# Patient Record
Sex: FEMALE | Race: ASIAN | Employment: FULL TIME | URBAN - METROPOLITAN AREA
[De-identification: names, ages, dates, MRNs, and addresses within clinical notes are randomized per-mention and may not be internally consistent; named-entity substitution may affect disease eponyms.]

---

## 2017-03-03 ENCOUNTER — OFFICE VISIT (OUTPATIENT)
Dept: FAMILY MEDICINE CLINIC | Age: 52
End: 2017-03-03

## 2017-03-03 VITALS
WEIGHT: 127 LBS | TEMPERATURE: 96.3 F | HEART RATE: 67 BPM | HEIGHT: 59 IN | RESPIRATION RATE: 20 BRPM | BODY MASS INDEX: 25.6 KG/M2 | SYSTOLIC BLOOD PRESSURE: 122 MMHG | DIASTOLIC BLOOD PRESSURE: 83 MMHG | OXYGEN SATURATION: 99 %

## 2017-03-03 DIAGNOSIS — I10 ESSENTIAL HYPERTENSION WITH GOAL BLOOD PRESSURE LESS THAN 140/90: ICD-10-CM

## 2017-03-03 DIAGNOSIS — E78.5 HYPERLIPIDEMIA, UNSPECIFIED HYPERLIPIDEMIA TYPE: ICD-10-CM

## 2017-03-03 DIAGNOSIS — R11.0 NAUSEA: ICD-10-CM

## 2017-03-03 DIAGNOSIS — E11.65 TYPE 2 DIABETES MELLITUS WITH HYPERGLYCEMIA, WITHOUT LONG-TERM CURRENT USE OF INSULIN (HCC): Primary | ICD-10-CM

## 2017-03-03 LAB
MICROALBUMIN UR TEST STR-MCNC: 150 MG/L
MICROALBUMIN/CREAT RATIO POC: NORMAL MG/G

## 2017-03-03 RX ORDER — AMOXICILLIN 500 MG/1
1 CAPSULE ORAL
COMMUNITY
Start: 2017-03-02 | End: 2018-09-05

## 2017-03-03 RX ORDER — IBUPROFEN 800 MG/1
TABLET ORAL AS NEEDED
COMMUNITY
Start: 2017-03-02 | End: 2020-02-13 | Stop reason: SDUPTHER

## 2017-03-03 RX ORDER — OMEPRAZOLE 20 MG/1
CAPSULE, DELAYED RELEASE ORAL
Qty: 30 CAP | Refills: 2 | Status: CANCELLED | OUTPATIENT
Start: 2017-03-03

## 2017-03-03 RX ORDER — ONDANSETRON 4 MG/1
4 TABLET, ORALLY DISINTEGRATING ORAL
Qty: 12 TAB | Refills: 0 | Status: SHIPPED | OUTPATIENT
Start: 2017-03-03 | End: 2018-09-05

## 2017-03-03 RX ORDER — METFORMIN HYDROCHLORIDE 1000 MG/1
TABLET ORAL
Qty: 180 TAB | Refills: 2 | Status: SHIPPED | OUTPATIENT
Start: 2017-03-03 | End: 2018-02-06 | Stop reason: SDUPTHER

## 2017-03-03 RX ORDER — LOSARTAN POTASSIUM 50 MG/1
50 TABLET ORAL DAILY
Qty: 90 TAB | Refills: 3 | Status: SHIPPED | OUTPATIENT
Start: 2017-03-03 | End: 2018-04-11 | Stop reason: SDUPTHER

## 2017-03-03 RX ORDER — LOVASTATIN 40 MG/1
40 TABLET ORAL
Qty: 90 TAB | Refills: 3 | Status: SHIPPED | OUTPATIENT
Start: 2017-03-03 | End: 2017-11-30 | Stop reason: DRUGHIGH

## 2017-03-03 RX ORDER — HYDROCODONE BITARTRATE AND ACETAMINOPHEN 10; 300 MG/1; MG/1
TABLET ORAL
COMMUNITY
End: 2018-09-05

## 2017-03-03 NOTE — PATIENT INSTRUCTIONS
DASH Diet: Care Instructions  Your Care Instructions  The DASH diet is an eating plan that can help lower your blood pressure. DASH stands for Dietary Approaches to Stop Hypertension. Hypertension is high blood pressure. The DASH diet focuses on eating foods that are high in calcium, potassium, and magnesium. These nutrients can lower blood pressure. The foods that are highest in these nutrients are fruits, vegetables, low-fat dairy products, nuts, seeds, and legumes. But taking calcium, potassium, and magnesium supplements instead of eating foods that are high in those nutrients does not have the same effect. The DASH diet also includes whole grains, fish, and poultry. The DASH diet is one of several lifestyle changes your doctor may recommend to lower your high blood pressure. Your doctor may also want you to decrease the amount of sodium in your diet. Lowering sodium while following the DASH diet can lower blood pressure even further than just the DASH diet alone. Follow-up care is a key part of your treatment and safety. Be sure to make and go to all appointments, and call your doctor if you are having problems. It's also a good idea to know your test results and keep a list of the medicines you take. How can you care for yourself at home? Following the DASH diet  · Eat 4 to 5 servings of fruit each day. A serving is 1 medium-sized piece of fruit, ½ cup chopped or canned fruit, 1/4 cup dried fruit, or 4 ounces (½ cup) of fruit juice. Choose fruit more often than fruit juice. · Eat 4 to 5 servings of vegetables each day. A serving is 1 cup of lettuce or raw leafy vegetables, ½ cup of chopped or cooked vegetables, or 4 ounces (½ cup) of vegetable juice. Choose vegetables more often than vegetable juice. · Get 2 to 3 servings of low-fat and fat-free dairy each day. A serving is 8 ounces of milk, 1 cup of yogurt, or 1 ½ ounces of cheese. · Eat 6 to 8 servings of grains each day.  A serving is 1 slice of bread, 1 ounce of dry cereal, or ½ cup of cooked rice, pasta, or cooked cereal. Try to choose whole-grain products as much as possible. · Limit lean meat, poultry, and fish to 2 servings each day. A serving is 3 ounces, about the size of a deck of cards. · Eat 4 to 5 servings of nuts, seeds, and legumes (cooked dried beans, lentils, and split peas) each week. A serving is 1/3 cup of nuts, 2 tablespoons of seeds, or ½ cup of cooked beans or peas. · Limit fats and oils to 2 to 3 servings each day. A serving is 1 teaspoon of vegetable oil or 2 tablespoons of salad dressing. · Limit sweets and added sugars to 5 servings or less a week. A serving is 1 tablespoon jelly or jam, ½ cup sorbet, or 1 cup of lemonade. · Eat less than 2,300 milligrams (mg) of sodium a day. If you limit your sodium to 1,500 mg a day, you can lower your blood pressure even more. Tips for success  · Start small. Do not try to make dramatic changes to your diet all at once. You might feel that you are missing out on your favorite foods and then be more likely to not follow the plan. Make small changes, and stick with them. Once those changes become habit, add a few more changes. · Try some of the following:  ¨ Make it a goal to eat a fruit or vegetable at every meal and at snacks. This will make it easy to get the recommended amount of fruits and vegetables each day. ¨ Try yogurt topped with fruit and nuts for a snack or healthy dessert. ¨ Add lettuce, tomato, cucumber, and onion to sandwiches. ¨ Combine a ready-made pizza crust with low-fat mozzarella cheese and lots of vegetable toppings. Try using tomatoes, squash, spinach, broccoli, carrots, cauliflower, and onions. ¨ Have a variety of cut-up vegetables with a low-fat dip as an appetizer instead of chips and dip. ¨ Sprinkle sunflower seeds or chopped almonds over salads. Or try adding chopped walnuts or almonds to cooked vegetables. ¨ Try some vegetarian meals using beans and peas. Add garbanzo or kidney beans to salads. Make burritos and tacos with mashed cardoso beans or black beans. Where can you learn more? Go to http://georgette-leslie.info/. Enter F576 in the search box to learn more about \"DASH Diet: Care Instructions. \"  Current as of: March 23, 2016  Content Version: 11.1  © 0724-7137 [employer]. Care instructions adapted under license by [employer] (which disclaims liability or warranty for this information). If you have questions about a medical condition or this instruction, always ask your healthcare professional. [address] any warranty or liability for your use of this information. Learning About Diabetes and Your Teeth  How does diabetes affect your teeth and gums? When you have diabetes, managing blood sugar levels and taking good care of your teeth and gums are both important. When blood sugar levels are high, there's a greater risk for:  · Gum (periodontal) disease. · Tooth decay. · Fungal infections in the mouth, like thrush. · Dry mouth, or xerostomia (say \"zee-ru-STO-gia-\"). The mouth needs saliva to neutralize the acids in your mouth. These acids can lead to gum disease and tooth decay. Keeping your blood sugar levels in your target range can help prevent problems with the teeth and gums. If you have any problems with your teeth or gums, see your dentist.  How do you care for your teeth and gums when you have diabetes? · Brush your teeth twice a day. · Floss daily. Make sure to press the floss against your teeth and not your gums. · Check each day for areas where your gums might be red or painful. Be sure to let your dentist know of any sores in your mouth. · See your dentist regularly for professional cleaning of your teeth and to look for gum problems. Many dentists recommend getting checkups twice a year. Remind your dentist that you have diabetes before any work is done.   · Don't smoke or use smokeless tobacco. Tobacco use with diabetes can lead to a greater risk of severe gum disease. If you need help quitting, talk to your doctor about stop-smoking programs and medicines. These can increase your chances of quitting for good. Follow-up care is a key part of your treatment and safety. Be sure to make and go to all appointments, and call your doctor if you are having problems. It's also a good idea to know your test results and keep a list of the medicines you take. Where can you learn more? Go to http://georgette-leslie.info/. Enter P737 in the search box to learn more about \"Learning About Diabetes and Your Teeth. \"  Current as of: May 23, 2016  Content Version: 11.1  © 2438-2844 [employer]. Care instructions adapted under license by [employer] (which disclaims liability or warranty for this information). If you have questions about a medical condition or this instruction, always ask your healthcare professional. [place] [address] any warranty or liability for your use of this information. Aprenda acerca de la diabetes y los dientes - [ Learning About Diabetes and Your Teeth ]  ¿Cómo afecta la diabetes a los dientes y a las encías? Cuando usted tiene diabetes, es importante controlar los niveles de azúcar en la sami y cuidarse lisbet los dientes y las encías. Cuando los niveles de azúcar en la sami están altos, hay un mayor riesgo de:  · Enfermedad de las encías (periodontal). · Caries. · Infecciones por hongos en la boca, symone candidiasis (aftas). · Sequedad de boca o xerostomía. La boca necesita saliva para neutralizar los ácidos en la boca. Estos ácidos pueden provocar enfermedad de las encías y caries dental.  Mantener los niveles de azúcar en la sami dentro de erin límites ideales puede ayudar a prevenir problemas con los dientes y las encías.  Si usted tiene algún problema con erin dientes o encías, consulte a carlisle dentista. ¿Cómo puede cuidarse los dientes y las encías cuando tiene diabetes? · Cepíllese los Aditya Hannifin veces al día. · Límpiese con hilo dental a diario. Asegúrese de presionar el hilo dental contra los dientes y no contra las encías. · Examínese las encías a diario para jon si hay zonas en las que están enrojecidas o adoloridas. No dude en informar a carlisle dentista si tiene llagas en la boca. · Visite a carlisle dentista con regularidad para leslee limpieza profesional de los dientes y para detectar problemas de las encías. Muchos dentistas recomiendan Dashawn Zee al año. Recuérdele a carlisle dentista que usted tiene diabetes antes de cualquier tratamiento dental.  · No fume ni use tabaco sin humo. Consumir tabaco cuando se tiene diabetes puede aumentar el riesgo de tener enfermedad de las encías grave. Si necesita ayuda para dejar el hábito, hable con carlisle médico sobre programas y medicamentos para dejar de fumar. Estos pueden aumentar erin probabilidades de dejar de fumar para siempre. La atención de seguimiento es leslee parte clave de carlisle tratamiento y seguridad. Asegúrese de hacer y acudir a todas las citas, y llame a carlisle médico si está teniendo problemas. También es leslee buena idea saber los resultados de erin exámenes y mantener leslee lista de los medicamentos que nicolette. ¿Dónde puede encontrar más información en inglés? Keanu García a http://georgette-leslie.info/. Escriba G654 en la búsqueda para aprender más acerca de \"Aprenda acerca de la diabetes y los dientes - [ Learning About Diabetes and Your Teeth ]. \"  Revisado: 23 [place], 2016  Versión del contenido: 11.1  © 9813-6996 [employer], [employer]. Las instrucciones de cuidado fueron adaptadas bajo licencia por [employer] (which disclaims liability or warranty for this information). Si usted tiene Seneca Vaughn afección médica o sobre estas instrucciones, siempre pregunte a carlisle profesional de sandra.  [employer], [employer] niega toda garantía o responsabilidad por carlisle uso de esta información.

## 2017-03-03 NOTE — MR AVS SNAPSHOT
Visit Information Date & Time Provider Department Dept. Phone Encounter #  
 3/3/2017  8:30 [address], [address] [id number] Follow-up Instructions Return in about 6 months (around 9/3/2017) for follow-up with Dr. Alex Woo. Upcoming Health Maintenance Date Due Pneumococcal 19-64 Medium Risk (1 of 1 - PPSV23) 11/13/1985 DTaP/Tdap/Td series (1 - Tdap) 11/13/1987 PAP AKA CERVICAL CYTOLOGY 6/15/2013 FOOT EXAM Q1 6/12/2016 INFLUENZA AGE 9 TO ADULT 8/1/2016 BREAST CANCER SCRN MAMMOGRAM 11/13/2016 FOBT Q 1 YEAR AGE 50-75 11/13/2016 MICROALBUMIN Q1 2/4/2017 HEMOGLOBIN A1C Q6M 2/11/2017 EYE EXAM RETINAL OR DILATED Q1 6/23/2017 LIPID PANEL Q1 8/11/2017 Allergies as of 3/3/2017  Review Complete On: 3/3/2017 By: Christie Moe LPN Severity Noted Reaction Type Reactions Lisinopril Medium 05/12/2014   Intolerance Cough Current Immunizations  Reviewed on 8/11/2016 No immunizations on file. Not reviewed this visit You Were Diagnosed With   
  
 Codes Comments Type 2 diabetes mellitus with hyperglycemia, without long-term current use of insulin (HCC)    -  Primary ICD-10-CM: E11.65 ICD-9-CM: 250.00, 790.29 Essential hypertension with goal blood pressure less than 140/90     ICD-10-CM: I10 
ICD-9-CM: 401.9 Hyperlipidemia, unspecified hyperlipidemia type     ICD-10-CM: E78.5 ICD-9-CM: 272.4 Nausea     ICD-10-CM: R11.0 ICD-9-CM: 787.02 Vitals BP  
  
  
  
  
  
 122/83 (BP 1 Location: Left arm, BP Patient Position: Sitting) Vitals History BMI and BSA Data Body Mass Index Body Surface Area  
 25.65 kg/m 2 1.55 m 2 Preferred Pharmacy Pharmacy Name Phone [facility] [address], [place] [phone] Your Updated Medication List  
  
   
 This list is accurate as of: 3/3/17  9:34 AM.  Always use your most recent med list.  
  
  
  
  
 aspirin 325 mg tablet Commonly known as:  ASPIRIN Take 325 mg by mouth daily. HYDROcodone-acetaminophen  mg Tab per tablet Commonly known as:  Colin Stai Take  by mouth.  
  
 losartan 50 mg tablet Commonly known as:  COZAAR Take 1 Tab by mouth daily. lovastatin 40 mg tablet Commonly known as:  MEVACOR Take 1 Tab by mouth nightly. Indications: mixed hyperlipidemia  
  
 metFORMIN 1,000 mg tablet Commonly known as:  GLUCOPHAGE  
TAKE ONE TABLET BY MOUTH TWICE DAILY WITH MEALS  Indications: type 2 diabetes mellitus  
  
 ondansetron 4 mg disintegrating tablet Commonly known as:  ZOFRAN ODT Take 1 Tab by mouth every eight (8) hours as needed for Nausea. SITagliptin 100 mg tablet Commonly known as:  Jesus Juan Take 1 Tab by mouth daily. Prescriptions Sent to Pharmacy Refills  
 losartan (COZAAR) 50 mg tablet 3 Sig: Take 1 Tab by mouth daily. Class: Normal  
 Pharmacy: [facility] [address] [address] [place] [address], [address] Ph #: [phone] Route: Oral  
 SITagliptin (JANUVIA) 100 mg tablet 6 Sig: Take 1 Tab by mouth daily. Class: Normal  
 Pharmacy: [facility] [address] [address], [address] Ph #: [phone] Route: Oral  
 metFORMIN (GLUCOPHAGE) 1,000 mg tablet 2 Sig: TAKE ONE TABLET BY MOUTH TWICE DAILY WITH MEALS  Indications: type 2 diabetes mellitus Class: Normal  
 Pharmacy: [facility] [address] [address], [address] Ph #: [phone]  
 lovastatin (MEVACOR) 40 mg tablet 3 Sig: Take 1 Tab by mouth nightly. Indications: mixed hyperlipidemia Class: Normal  
 Pharmacy: [id number] [address], [address] Ph #: [phone]  Route: Oral  
 ondansetron (ZOFRAN ODT) 4 mg disintegrating tablet 0  
 Sig: Take 1 Tab by mouth every eight (8) hours as needed for Nausea. Class: Normal  
 Pharmacy: [facility] [address], [address] Ph #: [phone] Route: Oral  
  
We Performed the Following AMB POC URINE, MICROALBUMIN, SEMIQUANTITATIVE [88097 CPT(R)] Follow-up Instructions Return in about 6 months (around 9/3/2017) for follow-up with Dr. Donald Chris. To-Do List   
 03/03/2017 Lab:  HEMOGLOBIN A1C WITH EAG Patient Instructions DASH Diet: Care Instructions Your Care Instructions The DASH diet is an eating plan that can help lower your blood pressure. DASH stands for Dietary Approaches to Stop Hypertension. Hypertension is high blood pressure. The DASH diet focuses on eating foods that are high in calcium, potassium, and magnesium. These nutrients can lower blood pressure. The foods that are highest in these nutrients are fruits, vegetables, low-fat dairy products, nuts, seeds, and legumes. But taking calcium, potassium, and magnesium supplements instead of eating foods that are high in those nutrients does not have the same effect. The DASH diet also includes whole grains, fish, and poultry. The DASH diet is one of several lifestyle changes your doctor may recommend to lower your high blood pressure. Your doctor may also want you to decrease the amount of sodium in your diet. Lowering sodium while following the DASH diet can lower blood pressure even further than just the DASH diet alone. Follow-up care is a key part of your treatment and safety. Be sure to make and go to all appointments, and call your doctor if you are having problems. It's also a good idea to know your test results and keep a list of the medicines you take. How can you care for yourself at home? Following the DASH diet · Eat 4 to 5 servings of fruit each day.  A serving is 1 medium-sized piece of fruit, ½ cup chopped or canned fruit, 1/4 cup dried fruit, or 4 ounces (½ cup) of fruit juice. Choose fruit more often than fruit juice. · Eat 4 to 5 servings of vegetables each day. A serving is 1 cup of lettuce or raw leafy vegetables, ½ cup of chopped or cooked vegetables, or 4 ounces (½ cup) of vegetable juice. Choose vegetables more often than vegetable juice. · Get 2 to 3 servings of low-fat and fat-free dairy each day. A serving is 8 ounces of milk, 1 cup of yogurt, or 1 ½ ounces of cheese. · Eat 6 to 8 servings of grains each day. A serving is 1 slice of bread, 1 ounce of dry cereal, or ½ cup of cooked rice, pasta, or cooked cereal. Try to choose whole-grain products as much as possible. · Limit lean meat, poultry, and fish to 2 servings each day. A serving is 3 ounces, about the size of a deck of cards. · Eat 4 to 5 servings of nuts, seeds, and legumes (cooked dried beans, lentils, and split peas) each week. A serving is 1/3 cup of nuts, 2 tablespoons of seeds, or ½ cup of cooked beans or peas. · Limit fats and oils to 2 to 3 servings each day. A serving is 1 teaspoon of vegetable oil or 2 tablespoons of salad dressing. · Limit sweets and added sugars to 5 servings or less a week. A serving is 1 tablespoon jelly or jam, ½ cup sorbet, or 1 cup of lemonade. · Eat less than 2,300 milligrams (mg) of sodium a day. If you limit your sodium to 1,500 mg a day, you can lower your blood pressure even more. Tips for success · Start small. Do not try to make dramatic changes to your diet all at once. You might feel that you are missing out on your favorite foods and then be more likely to not follow the plan. Make small changes, and stick with them. Once those changes become habit, add a few more changes. · Try some of the following: ¨ Make it a goal to eat a fruit or vegetable at every meal and at snacks. This will make it easy to get the recommended amount of fruits and vegetables each day. ¨ Try yogurt topped with fruit and nuts for a snack or healthy dessert. ¨ Add lettuce, tomato, cucumber, and onion to sandwiches. ¨ Combine a ready-made pizza crust with low-fat mozzarella cheese and lots of vegetable toppings. Try using tomatoes, squash, spinach, broccoli, carrots, cauliflower, and onions. ¨ Have a variety of cut-up vegetables with a low-fat dip as an appetizer instead of chips and dip. ¨ Sprinkle sunflower seeds or chopped almonds over salads. Or try adding chopped walnuts or almonds to cooked vegetables. ¨ Try some vegetarian meals using beans and peas. Add garbanzo or kidney beans to salads. Make burritos and tacos with mashed cardoso beans or black beans. Where can you learn more? Go to http://georgette-leslie.info/. Enter K666 in the search box to learn more about \"DASH Diet: Care Instructions. \" Current as of: March 23, 2016 Content Version: 11.1 © 3172-3215 [employer]. Care instructions adapted under license by [employer] (which disclaims liability or warranty for this information). If you have questions about a medical condition or this instruction, always ask your healthcare professional. [address] any warranty or liability for your use of this information. Learning About Diabetes and Your Teeth How does diabetes affect your teeth and gums? When you have diabetes, managing blood sugar levels and taking good care of your teeth and gums are both important. When blood sugar levels are high, there's a greater risk for: · Gum (periodontal) disease. · Tooth decay. · Fungal infections in the mouth, like thrush. · Dry mouth, or xerostomia (say \"zee-ruh-STO-gia-uh\"). The mouth needs saliva to neutralize the acids in your mouth. These acids can lead to gum disease and tooth decay. Keeping your blood sugar levels in your target range can help prevent problems with the teeth and gums.  If you have any problems with your teeth or gums, see your dentist. 
 How do you care for your teeth and gums when you have diabetes? · Brush your teeth twice a day. · Floss daily. Make sure to press the floss against your teeth and not your gums. · Check each day for areas where your gums might be red or painful. Be sure to let your dentist know of any sores in your mouth. · See your dentist regularly for professional cleaning of your teeth and to look for gum problems. Many dentists recommend getting checkups twice a year. Remind your dentist that you have diabetes before any work is done. · Don't smoke or use smokeless tobacco. Tobacco use with diabetes can lead to a greater risk of severe gum disease. If you need help quitting, talk to your doctor about stop-smoking programs and medicines. These can increase your chances of quitting for good. Follow-up care is a key part of your treatment and safety. Be sure to make and go to all appointments, and call your doctor if you are having problems. It's also a good idea to know your test results and keep a list of the medicines you take. Where can you learn more? Go to http://georgetteBackspacesleslie.info/. Enter M740 in the search box to learn more about \"Learning About Diabetes and Your Teeth. \" 
Current as of: May 23, 2016 Content Version: 11.1 © 0853-1879 [employer]. Care instructions adapted under license by [employer] (which disclaims liability or warranty for this information). If you have questions about a medical condition or this instruction, always ask your healthcare professional. [place] [address] any warranty or liability for your use of this information. Aprenda acerca de la diabetes y los dientes - [ Learning About Diabetes and Your Teeth ] Cómo afecta la diabetes a los dientes y a las encías? Cuando usted tiene diabetes, es importante controlar los niveles de azúcar en la sami y cuidarse lisbet los dientes y las encías.  Cuando los [employer] de azúcar en la sami están altos, hay un mayor riesgo de: 
· Enfermedad de las encías (periodontal). · Caries. · Infecciones por hongos en la boca, symone candidiasis (aftas). · Sequedad de boca o xerostomía. La boca necesita saliva para neutralizar los ácidos en la boca. Estos ácidos pueden provocar enfermedad de las encías y caries dental. 
Mantener los niveles de azúcar en la sami dentro de erin límites ideales puede ayudar a prevenir problemas con los dientes y las encías. Si usted tiene algún problema con erin dientes o encías, consulte a carlisle dentista. Cómo puede cuidarse los dientes y las encías cuando tiene diabetes? · Cepíllese los Aditya Hannifin veces al día. · Límpiese con hilo dental a diario. Asegúrese de presionar el hilo dental contra los dientes y no contra las encías. · Examínese las encías a diario para jon si hay zonas en las que están enrojecidas o adoloridas. No dude en informar a carlisle dentista si tiene llagas en la boca. · Visite a carlisle dentista con regularidad para leslee limpieza profesional de los dientes y para detectar problemas de las encías. Muchos dentistas recomiendan Dashawn Zee al año. Recuérdele a carlisle dentista que usted tiene diabetes antes de cualquier tratamiento dental. 
· No fume ni use tabaco sin humo. Consumir tabaco cuando se tiene diabetes puede aumentar el riesgo de tener enfermedad de las encías grave. Si necesita ayuda para dejar el hábito, hable con carlisle médico sobre programas y medicamentos para dejar de fumar. Estos pueden aumentar erin probabilidades de dejar de fumar para siempre. La atención de seguimiento es leslee parte clave de carlisle tratamiento y seguridad. Asegúrese de hacer y acudir a todas las citas, y llame a carlisle médico si está teniendo problemas. También es leslee buena idea saber los resultados de erin exámenes y mantener leslee lista de los medicamentos que nicolette. Dónde puede encontrar más información en inglés? Tristen Barbosa a http://georgette-leslie.info/. Escriba H932 en la búsqueda para aprender más acerca de \"Aprenda acerca de la diabetes y los dientes - [ Learning About Diabetes and Your Teeth ]. \" 
Revisado: 23 mayo, 2016 Versión del contenido: 11.1 © 1457-7381 Healthwise, Incorporated. Las instrucciones de cuidado fueron adaptadas bajo licencia por [employer] (which disclaims liability or warranty for this information). Si usted tiene [place] afección médica o sobre estas instrucciones, siempre pregunte a carlisle profesional de sandra. Healthwise, Incorporated niega toda garantía o responsabilidad por carlisle uso de esta información. Introducing [address]! Dear Mirella Minor: 
Thank you for requesting a [employer] account. Our records indicate that you already have an active [employer] account. You can access your account anytime at [email or web address] Did you know that you can access your hospital and ER discharge instructions at any time in [employer]? You can also review all of your test results from your hospital stay or ER visit. Additional Information If you have questions, please visit the Frequently Asked Questions section of the [employer] website at [email or web address] Remember, [employer] is NOT to be used for urgent needs. For medical emergencies, dial 911. Now available from your iPhone and Android! Please provide this summary of care documentation to your next provider. Your primary care clinician is listed as Misael Brooks. If you have any questions after today's visit, please call [phone].

## 2017-03-03 NOTE — PROGRESS NOTES
Chief Complaint   Patient presents with    Medication Refill     hypertension dm cholesterol         HPI:    This is a 48 y/o female patient who comes in today for follow-up on chronic conditions and medication refills. DM - last A1C 6.9. Currently on Metformin and Januvia. Denies significant complaints except for mouth pain secondary to tooth extraction done yesterday. Takes hydrocodone or ibuprofen for pain. Feels nauseated at times. Past Medical History:   Diagnosis Date    Environmental allergies     Headache(784.0)     hx migraines    HTN (hypertension) 9/23/2013    Lacunar infarct, acute ([address] 2/17/16    admitt[facility]/17-2/19    Stroke [facility])     tia 9/10/13    Type II or unspecified type diabetes mellitus without mention of complication, not stated as uncontrolled 10/10/2013     Social History   Substance Use Topics    Smoking status: Never Smoker    Smokeless tobacco: Never Used    Alcohol use No     Outpatient Encounter Prescriptions as of 3/3/2017   Medication Sig Dispense Refill    HYDROcodone-acetaminophen (XODOL)  mg tab per tablet Take  by mouth.  losartan (COZAAR) 50 mg tablet Take 1 Tab by mouth daily. 90 Tab 3    SITagliptin (JANUVIA) 100 mg tablet Take 1 Tab by mouth daily. 30 Tab 6    metFORMIN (GLUCOPHAGE) 1,000 mg tablet TAKE ONE TABLET BY MOUTH TWICE DAILY WITH MEALS  Indications: type 2 diabetes mellitus 180 Tab 2    lovastatin (MEVACOR) 40 mg tablet Take 1 Tab by mouth nightly. Indications: mixed hyperlipidemia 90 Tab 3    ondansetron (ZOFRAN ODT) 4 mg disintegrating tablet Take 1 Tab by mouth every eight (8) hours as needed for Nausea. 12 Tab 0    aspirin (ASPIRIN) 325 mg tablet Take 325 mg by mouth daily.  amoxicillin (AMOXIL) 500 mg capsule 1 Cap.  ibuprofen (MOTRIN) 800 mg tablet        No facility-administered encounter medications on file as of 3/3/2017. ROS:    Negative other than that mentioned in HPI.     Physical Exam:    Vital Signs:   Visit Vitals    /83 (BP 1 Location: Left arm, BP Patient Position: Sitting)    Pulse 67    Temp 96.3 °F (35.7 °C) (Oral)    Resp 20    Ht 4' 11\" (1.499 m)    Wt 127 lb (57.6 kg)    SpO2 99%  Comment: room air    BMI 25.65 kg/m2     General: a, a & o x 3, afebrile, well-nourished, interacting appropriately, in no acute distress  Skin: warm and dry, no rashes , no bruises  Respiratory: even chest expansion, lung sounds clear bilaterally, good respiratory effort,  no wheezes or crackles  Cardiovascular: normal S1S2, regular rate and rhythm, no murmurs, capillary refills < 2 sec, no JVD, no carotid bruits, all pulses palpable, 3+, no edema  Abdomen: non-distended, normoactive bowel sounds x 4 quadrants, soft, non-tender to palpation  Neurological: sensation and motor function intact,       Assessment/Plan:    1. Type 2 diabetes mellitus with hyperglycemia, without long-term current use of insulin (HCC)    - Last A1C 6.9  - SITagliptin (JANUVIA) 100 mg tablet; Take 1 Tab by mouth daily. Dispense: 30 Tab; Refill: 6  - metFORMIN (GLUCOPHAGE) 1,000 mg tablet; TAKE ONE TABLET BY MOUTH TWICE DAILY WITH MEALS  Indications: type 2 diabetes mellitus  Dispense: 180 Tab; Refill: 2  - HEMOGLOBIN A1C WITH EAG; Future  - AMB POC URINE, MICROALBUMIN, SEMIQUANTITATIVE    2. Essential hypertension with goal blood pressure less than 140/90    - stable, at goal  - losartan (COZAAR) 50 mg tablet; Take 1 Tab by mouth daily. Dispense: 90 Tab; Refill: 3    3. Hyperlipidemia, unspecified hyperlipidemia type    - last lipid profile stable  - lovastatin (MEVACOR) 40 mg tablet; Take 1 Tab by mouth nightly. Indications: mixed hyperlipidemia  Dispense: 90 Tab; Refill: 3    4. Nausea    - had dental work-up done, currently taking hydrocodone and ibuprofen for pain which make her nauseated at times  - ondansetron (ZOFRAN ODT) 4 mg disintegrating tablet;  Take 1 Tab by mouth every eight (8) hours as needed for Nausea. Dispense: 12 Tab; Refill: 0    Additional Notes: Discussed today's diagnosis, treatment plans. Discussed medication indications and side effects. After Visit Summary: Discussed provided printed patient instructions. Answered questions . Follow-up Disposition:  Return in about 6 months (around 9/3/2017) for follow-up with Dr. Prerna Mc. Ryan Blancas, ANP-BC  Adult Medicine  [facility]

## 2017-03-03 NOTE — PROGRESS NOTES
Pt here today for medication refills for hypertension dm and cholesterol meds    1. Have you been to the ER, urgent care clinic since your last visit? Hospitalized since your last visit? No    2. Have you seen or consulted any other health care providers outside of the [address] [place] [address] since your last visit? Include any pap smears or colon screening.  No

## 2017-03-04 LAB
EST. AVERAGE GLUCOSE BLD GHB EST-MCNC: 134 MG/DL
HBA1C MFR BLD: 6.3 % (ref 4.8–5.6)

## 2017-11-30 ENCOUNTER — OFFICE VISIT (OUTPATIENT)
Dept: FAMILY MEDICINE CLINIC | Age: 52
End: 2017-11-30

## 2017-11-30 VITALS
WEIGHT: 131 LBS | HEIGHT: 59 IN | RESPIRATION RATE: 18 BRPM | TEMPERATURE: 96.3 F | SYSTOLIC BLOOD PRESSURE: 112 MMHG | HEART RATE: 72 BPM | BODY MASS INDEX: 26.41 KG/M2 | DIASTOLIC BLOOD PRESSURE: 71 MMHG

## 2017-11-30 DIAGNOSIS — E11.65 TYPE 2 DIABETES MELLITUS WITH HYPERGLYCEMIA, WITHOUT LONG-TERM CURRENT USE OF INSULIN (HCC): ICD-10-CM

## 2017-11-30 DIAGNOSIS — Z12.31 SCREENING MAMMOGRAM, ENCOUNTER FOR: ICD-10-CM

## 2017-11-30 DIAGNOSIS — I10 ESSENTIAL HYPERTENSION WITH GOAL BLOOD PRESSURE LESS THAN 140/90: Primary | ICD-10-CM

## 2017-11-30 RX ORDER — LOVASTATIN 20 MG/1
40 TABLET ORAL
Qty: 180 TAB | Refills: 2 | Status: SHIPPED | OUTPATIENT
Start: 2017-11-30 | End: 2018-06-29 | Stop reason: DRUGHIGH

## 2017-11-30 NOTE — PATIENT INSTRUCTIONS
Learning About ACE Inhibitors and ARBs for Diabetes  Introduction    ACE inhibitors and ARBs are medicines used to control blood pressure. They allow blood vessels to relax and open up. This lowers your blood pressure. When you have diabetes, taking an ACE inhibitor or ARB can help to:  · Treat high blood pressure. Your risk of problems from diabetes goes up when you have high blood pressure. · Prevent or slow kidney damage. Diabetes can damage the blood vessels in the kidneys. High blood pressure can damage the kidneys, too. · Lower the risks of stroke and heart attack. Your risks go up when you have high blood pressure, heart disease, or both. An ACE inhibitor or ARB is a good choice for people with diabetes. Unlike some medicines, these don't affect blood sugar levels. Examples  ACE inhibitors include:  · Benazepril. · Lisinopril. · Ramipril. ARBs include:  · Irbesartan. · Losartan. · Telmisartan. Possible side effects  All medicines can cause side effects. Some side effects of ACE inhibitors include:  · Low blood pressure. You may feel dizzy and weak. · A cough. · High potassium levels. · An allergic reaction of the skin. Symptoms may range from mild swelling to painful welts. Some side effects of ARBs include:  · Diarrhea. · High potassium levels. · Sinus problems. · Stomach problems. You may have other side effects or reactions not listed here. Check the information that comes with your medicine. What to know about taking this medicine  · Be safe with medicines. Take your medicines exactly as prescribed. Call your doctor if you think you are having a problem with your medicine. · Before starting an ACE inhibitor or ARB, tell your doctor if you:  ¨ Use a salt substitute. ¨ Take diuretics or potassium tablets. · These medicines are not safe for pregnancy. If you are pregnant or planning to be, talk to your doctor about a safe blood pressure medicine.   · ACE inhibitors can cause a dry cough. If the cough is bad, talk to your doctor. Switching to an ARB is likely to help. · Taking some medicines together can cause problems. Tell your doctor or pharmacist all the medicines you take. This includes over-the-counter medicines, vitamins, herbal products, and supplements. · You may need regular blood and urine tests. Where can you learn more? Go to http://georgette-leslie.info/. Enter M316 in the search box to learn more about \"Learning About ACE Inhibitors and ARBs for Diabetes. \"  Current as of: March 13, 2017  Content Version: 11.4  © 2161-1290 [employer]. Care instructions adapted under license by [employer] (which disclaims liability or warranty for this information). If you have questions about a medical condition or this instruction, always ask your healthcare professional. [address] any warranty or liability for your use of this information.

## 2017-11-30 NOTE — PROGRESS NOTES
Joseluis Jones is a 47 y.o. female and presents with Follow-up (DM, HTN, hyperlipidemia) and Medication Refill       Subjective:    No current problems, here for routine follow up of chronic conditions. ROS:  Constitutional: No recent weight change. No weakness/fatigue. No fever or chills   Skin: No rashes, change in nails/hair, itching   HENT: No HA, dizziness. No hearing loss/tinnitus. No nasal congestion/discharge. Eyes: No change in vision, double/blurred vision or eye pain/redness. Cardiovascular: No CP/palpitations. No JASON/orthopnea/PND. Respiratory: No cough/sputum, dyspnea, wheezing. Gastointestinal: No dysphagia, reflux. No n/v. No constipation/diarrhea. No melena/rectal bleeding. Neurological: No seizures/numbness/weakness. No paresthesias. Psychiatric:  No depression, anxiety. The problem list was updated as a part of today's visit. Patient Active Problem List   Diagnosis Code    Aneurysm of middle cerebral artery I67.1    Hyperglycemia due to type 2 diabetes mellitus ([facility] [address] E11.65    Essential hypertension with goal blood pressure less than 140/90 I10    Gastroesophageal reflux disease K21.9    Multiple thyroid nodules E04.2       The PSH, FH were reviewed. SH:  Social History   Substance Use Topics    Smoking status: Never Smoker    Smokeless tobacco: Never Used    Alcohol use No         Medications/Allergies:  Current Outpatient Prescriptions on File Prior to Visit   Medication Sig Dispense Refill    losartan (COZAAR) 50 mg tablet Take 1 Tab by mouth daily. 90 Tab 3    metFORMIN (GLUCOPHAGE) 1,000 mg tablet TAKE ONE TABLET BY MOUTH TWICE DAILY WITH MEALS  Indications: type 2 diabetes mellitus 180 Tab 2    aspirin (ASPIRIN) 325 mg tablet Take 325 mg by mouth daily.  HYDROcodone-acetaminophen (XODOL)  mg tab per tablet Take  by mouth.       ondansetron (ZOFRAN ODT) 4 mg disintegrating tablet Take 1 Tab by mouth every eight (8) hours as needed for Nausea. 12 Tab 0    amoxicillin (AMOXIL) 500 mg capsule 1 Cap.  ibuprofen (MOTRIN) 800 mg tablet        No current facility-administered medications on file prior to visit. Allergies   Allergen Reactions    Lisinopril Cough       Objective:  Visit Vitals    /71    Pulse 72    Temp 96.3 °F (35.7 °C) (Oral)    Resp 18    Ht 4' 11\" (1.499 m)    Wt 131 lb (59.4 kg)    LMP 03/15/2015    BMI 26.46 kg/m2    Body mass index is 26.46 kg/(m^2). Constitutional: Well developed, nourished, no distress, alert, obese habitus   HENT: Exterior ears and tympanic membranes normal bilaterally. Supple neck. No thyromegaly or lymphadenopathy. Oropharynx clear and moist mucous membranes. Eyes: Conjunctiva normal. PERRL. CV: S1, S2.  RRR. No murmurs/rubs. No thrills palpated. No carotid bruits. Intact distal pulses. No edema. Pulm: No abnormalities on inspection. Clear to auscultation bilaterally. No wheezing/rhonchi. Normal effort. MS: Gait normal.  Joints without deformity/tenderness. Strength intact bilateral upper and lower ext. Normal ROM all extremities. Neuro: A/O x 3.  no focal motor or sensory deficits. Speech normal.   Skin: No lesions/rashes on inspection. Psych: Appropriate affect, judgement and insight. Short-term memory intact.        Labwork and Ancillary Studies:    CBC w/Diff  Lab Results   Component Value Date/Time    WBC 8.4 08/11/2016 01:13 AM    HGB 13.6 08/11/2016 01:13 AM    PLATELET 843 53/36/8255 01:13 AM         Basic Metabolic Profile  Lab Results   Component Value Date/Time    Sodium 141 06/12/2015 10:50 AM    Potassium 3.8 06/12/2015 10:50 AM    Chloride 98 06/12/2015 10:50 AM    CO2 25 06/12/2015 10:50 AM    Anion gap 10.0 04/02/2014 03:00 PM    Glucose 102 06/12/2015 10:50 AM    BUN 13 06/12/2015 10:50 AM    Creatinine 0.69 06/12/2015 10:50 AM    BUN/Creatinine ratio 19 06/12/2015 10:50 AM    GFR est  06/12/2015 10:50 AM    GFR est non- 06/12/2015 10:50 AM    Calcium 9.6 06/12/2015 10:50 AM        Cholesterol  Lab Results   Component Value Date/Time    Cholesterol, total 154 08/11/2016 01:13 AM    HDL Cholesterol 45 08/11/2016 01:13 AM    LDL, calculated 80 08/11/2016 01:13 AM    Triglyceride 147 08/11/2016 01:13 AM       Assessment/Plan:    Diagnoses and all orders for this visit:    1. Essential hypertension with goal blood pressure less than 140/90  -     MICROALBUMIN, UR, RAND W/ MICROALBUMIN/CREA RATIO; Future  -     METABOLIC PANEL, COMPREHENSIVE; Future    2. Type 2 diabetes mellitus with hyperglycemia, without long-term current use of insulin (HCC)  -     HEMOGLOBIN A1C WITH EAG; Future  -     LIPID PANEL; Future  -     METABOLIC PANEL, COMPREHENSIVE; Future  -     SITagliptin (JANUVIA) 100 mg tablet; Take 1 Tab by mouth daily. -     lovastatin (MEVACOR) 20 mg tablet; Take 2 Tabs by mouth nightly. 3. Screening mammogram, encounter for  -     [facility] MAMMO BI SCREENING INCL CAD; Future      Pt. Seen today for standard chronic illness f/u. Labs drawn to monitor kidneys, liver, glucose, and A1c. F/u in 6 months    Health Maintenance:   Health Maintenance   Topic Date Due    Pneumococcal 19-64 Medium Risk (1 of 1 - PPSV23) 11/13/1985    DTaP/Tdap/Td series (1 - Tdap) 11/13/1987    PAP AKA CERVICAL CYTOLOGY  06/15/2013    FOOT EXAM Q1  06/12/2016    BREAST CANCER SCRN MAMMOGRAM  11/13/2016    FOBT Q 1 YEAR AGE 50-75  11/13/2016    EYE EXAM RETINAL OR DILATED Q1  06/23/2017    Influenza Age 5 to Adult  08/01/2017    LIPID PANEL Q1  08/11/2017    HEMOGLOBIN A1C Q6M  09/03/2017    MICROALBUMIN Q1  03/03/2018       No orders of the defined types were placed in this encounter. Dang Rodriguez, CARINEP-BC  [address] [facility]   [address] [facility] [address]  [address]

## 2017-11-30 NOTE — MR AVS SNAPSHOT
Visit Information Date & Time Provider Department Dept. Phone Encounter #  
 11/30/2017 12:30 PM Unique Arana, [address] [facility] [phone] [id number] Follow-up Instructions Return in about 6 months (around 5/30/2018), or if symptoms worsen or fail to improve. Upcoming Health Maintenance Date Due Pneumococcal 19-64 Medium Risk (1 of 1 - PPSV23) 11/13/1985 DTaP/Tdap/Td series (1 - Tdap) 11/13/1987 PAP AKA CERVICAL CYTOLOGY 6/15/2013 FOOT EXAM Q1 6/12/2016 BREAST CANCER SCRN MAMMOGRAM 11/13/2016 FOBT Q 1 YEAR AGE 50-75 11/13/2016 EYE EXAM RETINAL OR DILATED Q1 6/23/2017 Influenza Age 5 to Adult 8/1/2017 LIPID PANEL Q1 8/11/2017 HEMOGLOBIN A1C Q6M 9/3/2017 MICROALBUMIN Q1 3/3/2018 Allergies as of 11/30/2017  Review Complete On: 11/30/2017 By: Unique Arana NP Severity Noted Reaction Type Reactions Lisinopril Medium 05/12/2014   Intolerance Cough Current Immunizations  Reviewed on 3/3/2017 No immunizations on file. Not reviewed this visit You Were Diagnosed With   
  
 Codes Comments Essential hypertension with goal blood pressure less than 140/90    -  Primary ICD-10-CM: I10 
ICD-9-CM: 401.9 Type 2 diabetes mellitus with hyperglycemia, without long-term current use of insulin (HCC)     ICD-10-CM: E11.65 ICD-9-CM: 250.00, 790.29 Screening mammogram, encounter for     ICD-10-CM: Z12.31 
ICD-9-CM: V76.12 Vitals BP Pulse Temp Resp Height(growth percentile) Weight(growth percentile) 112/71 72 96.3 °F (35.7 °C) (Oral) 18 4' 11\" (1.499 m) 131 lb (59.4 kg) LMP BMI OB Status Smoking Status 03/15/2015 26.46 kg/m2 Postmenopausal Never Smoker Vitals History BMI and BSA Data Body Mass Index Body Surface Area  
 26.46 kg/m 2 1.57 m 2 Preferred Pharmacy Pharmacy Name Phone [facility] [address], [address] [place] [address] [phone] Your Updated Medication List  
  
   
This list is accurate as of: 11/30/17  1:07 PM.  Always use your most recent med list.  
  
  
  
  
 amoxicillin 500 mg capsule Commonly known as:  AMOXIL  
1 Cap. aspirin 325 mg tablet Commonly known as:  ASPIRIN Take 325 mg by mouth daily. HYDROcodone-acetaminophen  mg Tab per tablet Commonly known as:  Deb Click Take  by mouth. ibuprofen 800 mg tablet Commonly known as:  MOTRIN  
  
 losartan 50 mg tablet Commonly known as:  COZAAR Take 1 Tab by mouth daily. lovastatin 20 mg tablet Commonly known as:  MEVACOR Take 2 Tabs by mouth nightly. metFORMIN 1,000 mg tablet Commonly known as:  GLUCOPHAGE  
TAKE ONE TABLET BY MOUTH TWICE DAILY WITH MEALS  Indications: type 2 diabetes mellitus  
  
 ondansetron 4 mg disintegrating tablet Commonly known as:  ZOFRAN ODT Take 1 Tab by mouth every eight (8) hours as needed for Nausea. SITagliptin 100 mg tablet Commonly known as:  Vallery Del Real Take 1 Tab by mouth daily. Prescriptions Sent to Pharmacy Refills SITagliptin (JANUVIA) 100 mg tablet 6 Sig: Take 1 Tab by mouth daily. Class: Normal  
 Pharmacy: [facility] [id number], [address] Ph #: [phone] Route: Oral  
 lovastatin (MEVACOR) 20 mg tablet 2 Sig: Take 2 Tabs by mouth nightly. Class: Normal  
 Pharmacy: [facility] [id number], [address] Ph #: [phone] Route: Oral  
  
Follow-up Instructions Return in about 6 months (around 5/30/2018), or if symptoms worsen or fail to improve. To-Do List   
 11/30/2017 Lab:  HEMOGLOBIN A1C WITH EAG   
  
 11/30/2017 Lab:  LIPID PANEL   
  
 11/30/2017 Imaging:  [facility] MAMMO BI SCREENING INCL CAD   
  
 11/30/2017 Lab:  METABOLIC PANEL, COMPREHENSIVE   
  
 11/30/2017 Lab:  MICROALBUMIN, UR, RAND W/ MICROALBUMIN/CREA RATIO Patient Instructions Learning About ACE Inhibitors and ARBs for Diabetes Introduction ACE inhibitors and ARBs are medicines used to control blood pressure. They allow blood vessels to relax and open up. This lowers your blood pressure. When you have diabetes, taking an ACE inhibitor or ARB can help to: · Treat high blood pressure. Your risk of problems from diabetes goes up when you have high blood pressure. · Prevent or slow kidney damage. Diabetes can damage the blood vessels in the kidneys. High blood pressure can damage the kidneys, too. · Lower the risks of stroke and heart attack. Your risks go up when you have high blood pressure, heart disease, or both. An ACE inhibitor or ARB is a good choice for people with diabetes. Unlike some medicines, these don't affect blood sugar levels. Examples ACE inhibitors include: · Benazepril. · Lisinopril. · Ramipril. ARBs include: · Irbesartan. · Losartan. · Telmisartan. Possible side effects All medicines can cause side effects. Some side effects of ACE inhibitors include: 
· Low blood pressure. You may feel dizzy and weak. · A cough. · High potassium levels. · An allergic reaction of the skin. Symptoms may range from mild swelling to painful welts. Some side effects of ARBs include: · Diarrhea. · High potassium levels. · Sinus problems. · Stomach problems. You may have other side effects or reactions not listed here. Check the information that comes with your medicine. What to know about taking this medicine · Be safe with medicines. Take your medicines exactly as prescribed. Call your doctor if you think you are having a problem with your medicine. · Before starting an ACE inhibitor or ARB, tell your doctor if you: ¨ Use a salt substitute. ¨ Take diuretics or potassium tablets. · These medicines are not safe for pregnancy. If you are pregnant or planning to be, talk to your doctor about a safe blood pressure medicine. · ACE inhibitors can cause a dry cough. If the cough is bad, talk to your doctor. Switching to an ARB is likely to help. · Taking some medicines together can cause problems. Tell your doctor or pharmacist all the medicines you take. This includes over-the-counter medicines, vitamins, herbal products, and supplements. · You may need regular blood and urine tests. Where can you learn more? Go to http://georgette-leslie.info/. Enter M316 in the search box to learn more about \"Learning About ACE Inhibitors and ARBs for Diabetes. \" Current as of: March 13, 2017 Content Version: 11.4 © 4940-4419 Tomorrowish. Care instructions adapted under license by [facility] (which disclaims liability or warranty for this information). If you have questions about a medical condition or this instruction, always ask your healthcare professional. [address] any warranty or liability for your use of this information. Introducing [facility]! Dear Clare Dos Santos: 
Thank you for requesting a [employer] account. Our records indicate that you already have an active [employer] account. You can access your account anytime at [email or web address]"[email or web address]. [email or web address]/"[employer] Did you know that you can access your hospital and ER discharge instructions at any time in [employer]? You can also review all of your test results from your hospital stay or ER visit. Additional Information If you have questions, please visit the Frequently Asked Questions section of the [employer] website at [email or web address]/"[employer]/. Remember, [employer] is NOT to be used for urgent needs. For medical emergencies, dial 911. Now available from your iPhone and Android! Please provide this summary of care documentation to your next provider. Your primary care clinician is listed as Shi Lagunas. If you have any questions after today's visit, please call [phone].

## 2017-11-30 NOTE — PROGRESS NOTES
1. Have you been to the ER, urgent care clinic since your last visit? Hospitalized since your last visit? No    2. Have you seen or consulted any other health care providers outside of the [address] [place] [address] since your last visit? Include any pap smears or colon screening.  No  \

## 2017-12-01 LAB
ALBUMIN SERPL-MCNC: 4.4 G/DL (ref 3.5–5.5)
ALBUMIN/CREAT UR: <3.8 MG/G CREAT (ref 0–30)
ALBUMIN/GLOB SERPL: 1.5 {RATIO} (ref 1.2–2.2)
ALP SERPL-CCNC: 60 IU/L (ref 39–117)
ALT SERPL-CCNC: 22 IU/L (ref 0–32)
AST SERPL-CCNC: 16 IU/L (ref 0–40)
BILIRUB SERPL-MCNC: 0.3 MG/DL (ref 0–1.2)
BUN SERPL-MCNC: 16 MG/DL (ref 6–24)
BUN/CREAT SERPL: 27 (ref 9–23)
CALCIUM SERPL-MCNC: 9.6 MG/DL (ref 8.7–10.2)
CHLORIDE SERPL-SCNC: 101 MMOL/L (ref 96–106)
CHOLEST SERPL-MCNC: 202 MG/DL (ref 100–199)
CO2 SERPL-SCNC: 28 MMOL/L (ref 18–29)
CREAT SERPL-MCNC: 0.59 MG/DL (ref 0.57–1)
CREAT UR-MCNC: 78.6 MG/DL
EST. AVERAGE GLUCOSE BLD GHB EST-MCNC: 134 MG/DL
GFR SERPLBLD CREATININE-BSD FMLA CKD-EPI: 106 ML/MIN/1.73
GFR SERPLBLD CREATININE-BSD FMLA CKD-EPI: 123 ML/MIN/1.73
GLOBULIN SER CALC-MCNC: 3 G/DL (ref 1.5–4.5)
GLUCOSE SERPL-MCNC: 109 MG/DL (ref 65–99)
HBA1C MFR BLD: 6.3 % (ref 4.8–5.6)
HDLC SERPL-MCNC: 43 MG/DL
INTERPRETATION, 910389: NORMAL
LDLC SERPL CALC-MCNC: 126 MG/DL (ref 0–99)
MICROALBUMIN UR-MCNC: <3 UG/ML
POTASSIUM SERPL-SCNC: 4.1 MMOL/L (ref 3.5–5.2)
PROT SERPL-MCNC: 7.4 G/DL (ref 6–8.5)
SODIUM SERPL-SCNC: 141 MMOL/L (ref 134–144)
TRIGL SERPL-MCNC: 163 MG/DL (ref 0–149)
VLDLC SERPL CALC-MCNC: 33 MG/DL (ref 5–40)

## 2018-02-12 ENCOUNTER — OFFICE VISIT (OUTPATIENT)
Dept: FAMILY MEDICINE CLINIC | Age: 53
End: 2018-02-12

## 2018-02-12 VITALS
SYSTOLIC BLOOD PRESSURE: 124 MMHG | HEIGHT: 59 IN | DIASTOLIC BLOOD PRESSURE: 81 MMHG | HEART RATE: 88 BPM | RESPIRATION RATE: 16 BRPM | TEMPERATURE: 98.2 F | BODY MASS INDEX: 27.21 KG/M2 | WEIGHT: 135 LBS

## 2018-02-12 DIAGNOSIS — J00 ACUTE NASOPHARYNGITIS: Primary | ICD-10-CM

## 2018-02-12 RX ORDER — AZITHROMYCIN 250 MG/1
TABLET, FILM COATED ORAL
Qty: 6 TAB | Refills: 0 | Status: SHIPPED | OUTPATIENT
Start: 2018-02-12 | End: 2018-02-17

## 2018-02-12 RX ORDER — BENZONATATE 200 MG/1
200 CAPSULE ORAL
Qty: 21 CAP | Refills: 0 | Status: SHIPPED | OUTPATIENT
Start: 2018-02-12 | End: 2018-02-19

## 2018-02-12 NOTE — MR AVS SNAPSHOT
Ruddy Gilbert [place] [address] [facility] [address] [facility] [address] [address] 
[phone] Patient: Viviana Snow MRN: [id number] :1965 Visit Information Date & Time Provider Department Dept. Phone Encounter #  
 2018  2:30 PM Maikel Collado, [facility] [address] [id number] Follow-up Instructions Return if symptoms worsen or fail to improve. Upcoming Health Maintenance Date Due Pneumococcal 19-64 Medium Risk (1 of 1 - PPSV23) 1985 DTaP/Tdap/Td series (1 - Tdap) 1987 PAP AKA CERVICAL CYTOLOGY 6/15/2013 FOOT EXAM Q1 2016 BREAST CANCER SCRN MAMMOGRAM 2016 FOBT Q 1 YEAR AGE 50-75 2016 EYE EXAM RETINAL OR DILATED Q1 2017 Influenza Age 5 to Adult 2017 HEMOGLOBIN A1C Q6M 2018 MICROALBUMIN Q1 2018 LIPID PANEL Q1 2018 Allergies as of 2018  Review Complete On: 2018 By: Yulia Holman LPN Severity Noted Reaction Type Reactions Lisinopril Medium 2014   Intolerance Cough Current Immunizations  Reviewed on 3/3/2017 No immunizations on file. Not reviewed this visit You Were Diagnosed With   
  
 Codes Comments Viral upper respiratory tract infection    -  Primary ICD-10-CM: J06.9, B97.89 ICD-9-CM: 465.9 Vitals BP Pulse Temp Resp Height(growth percentile) Weight(growth percentile) 124/81 88 98.2 °F (36.8 °C) (Oral) 16 4' 11\" (1.499 m) 135 lb (61.2 kg) LMP BMI OB Status Smoking Status 03/15/2015 27.27 kg/m2 Postmenopausal Never Smoker Vitals History BMI and BSA Data Body Mass Index Body Surface Area  
 27.27 kg/m 2 1.6 m 2 Preferred Pharmacy Pharmacy Name Phone [address], [facility] [address] [phone] Your Updated Medication List  
  
   
This list is accurate as of: 18  2:55 PM.  Always use your most recent med list.  
  
  
  
  
 amoxicillin 500 mg capsule Commonly known as:  AMOXIL  
1 Cap. aspirin 325 mg tablet Commonly known as:  ASPIRIN Take 325 mg by mouth daily. benzonatate 200 mg capsule Commonly known as:  TESSALON Take 1 Cap by mouth three (3) times daily as needed for Cough for up to 7 days. HYDROcodone-acetaminophen  mg Tab per tablet Commonly known as:  Beola Loach Take  by mouth. ibuprofen 800 mg tablet Commonly known as:  MOTRIN  
  
 losartan 50 mg tablet Commonly known as:  COZAAR Take 1 Tab by mouth daily. lovastatin 20 mg tablet Commonly known as:  MEVACOR Take 2 Tabs by mouth nightly. metFORMIN 1,000 mg tablet Commonly known as:  GLUCOPHAGE  
TAKE ONE TABLET BY MOUTH TWICE DAILY WITH MEALS  Indications: type 2 diabetes mellitus  
  
 ondansetron 4 mg disintegrating tablet Commonly known as:  ZOFRAN ODT Take 1 Tab by mouth every eight (8) hours as needed for Nausea. SITagliptin 100 mg tablet Commonly known as:  Eva Cha Take 1 Tab by mouth daily. Prescriptions Sent to Pharmacy Refills  
 benzonatate (TESSALON) 200 mg capsule 0 Sig: Take 1 Cap by mouth three (3) times daily as needed for Cough for up to 7 days. Class: Normal  
 Pharmacy: [facility] [address] [facility] [address], [address] Ph #: [phone] Route: Oral  
  
Follow-up Instructions Return if symptoms worsen or fail to improve. Patient Instructions Upper Respiratory Infection (Cold): Care Instructions Your Care Instructions An upper respiratory infection, or URI, is an infection of the nose, sinuses, or throat. URIs are spread by coughs, sneezes, and direct contact. The common cold is the most frequent kind of URI. The flu and sinus infections are other kinds of URIs. Almost all URIs are caused by viruses. Antibiotics won't cure them.  But you can treat most infections with home care. This may include drinking lots of fluids and taking over-the-counter pain medicine. You will probably feel better in 4 to 10 days. The doctor has checked you carefully, but problems can develop later. If you notice any problems or new symptoms, get medical treatment right away. Follow-up care is a key part of your treatment and safety. Be sure to make and go to all appointments, and call your doctor if you are having problems. It's also a good idea to know your test results and keep a list of the medicines you take. How can you care for yourself at home? · To prevent dehydration, drink plenty of fluids, enough so that your urine is light yellow or clear like water. Choose water and other caffeine-free clear liquids until you feel better. If you have kidney, heart, or liver disease and have to limit fluids, talk with your doctor before you increase the amount of fluids you drink. · Take an over-the-counter pain medicine, such as acetaminophen (Tylenol), ibuprofen (Advil, Motrin), or naproxen (Aleve). Read and follow all instructions on the label. · Before you use cough and cold medicines, check the label. These medicines may not be safe for young children or for people with certain health problems. · Be careful when taking over-the-counter cold or flu medicines and Tylenol at the same time. Many of these medicines have acetaminophen, which is Tylenol. Read the labels to make sure that you are not taking more than the recommended dose. Too much acetaminophen (Tylenol) can be harmful. · Get plenty of rest. 
· Do not smoke or allow others to smoke around you. If you need help quitting, talk to your doctor about stop-smoking programs and medicines. These can increase your chances of quitting for good. When should you call for help? Call 911 anytime you think you may need emergency care. For example, call if: 
? · You have severe trouble breathing. ?Call your doctor now or seek immediate medical care if: 
? · You seem to be getting much sicker. ? · You have new or worse trouble breathing. ? · You have a new or higher fever. ? · You have a new rash. ? Watch closely for changes in your health, and be sure to contact your doctor if: 
? · You have a new symptom, such as a sore throat, an earache, or sinus pain. ? · You cough more deeply or more often, especially if you notice more mucus or a change in the color of your mucus. ? · You do not get better as expected. Where can you learn more? Go to http://georgette-leslie.info/. Enter R606 in the search box to learn more about \"Upper Respiratory Infection (Cold): Care Instructions. \" Current as of: May 12, 2017 Content Version: 11.4 © 4460-9872 Sonnedix. Care instructions adapted under license by [employer] (which disclaims liability or warranty for this information). If you have questions about a medical condition or this instruction, always ask your healthcare professional. [address] any warranty or liability for your use of this information. Introducing [facility]! Dear Kelli Rabago: 
Thank you for requesting a Kay[employer] technology account. Our records indicate that you already have an active [employer] technology account. You can access your account anytime at [email or web address] Did you know that you can access your hospital and ER discharge instructions at any time in Kayo technology? You can also review all of your test results from your hospital stay or ER visit. Additional Information If you have questions, please visit the Frequently Asked Questions section of the [employer] website at [email or web address] Remember, [employer] technology is NOT to be used for urgent needs. For medical emergencies, dial 911. Now available from your iPhone and Android! Please provide this summary of care documentation to your next provider. Your primary care clinician is listed as Carl Christian. If you have any questions after today's visit, please call [phone].

## 2018-02-12 NOTE — PATIENT INSTRUCTIONS
Upper Respiratory Infection (Cold): Care Instructions  Your Care Instructions    An upper respiratory infection, or URI, is an infection of the nose, sinuses, or throat. URIs are spread by coughs, sneezes, and direct contact. The common cold is the most frequent kind of URI. The flu and sinus infections are other kinds of URIs. Almost all URIs are caused by viruses. Antibiotics won't cure them. But you can treat most infections with home care. This may include drinking lots of fluids and taking over-the-counter pain medicine. You will probably feel better in 4 to 10 days. The doctor has checked you carefully, but problems can develop later. If you notice any problems or new symptoms, get medical treatment right away. Follow-up care is a key part of your treatment and safety. Be sure to make and go to all appointments, and call your doctor if you are having problems. It's also a good idea to know your test results and keep a list of the medicines you take. How can you care for yourself at home? · To prevent dehydration, drink plenty of fluids, enough so that your urine is light yellow or clear like water. Choose water and other caffeine-free clear liquids until you feel better. If you have kidney, heart, or liver disease and have to limit fluids, talk with your doctor before you increase the amount of fluids you drink. · Take an over-the-counter pain medicine, such as acetaminophen (Tylenol), ibuprofen (Advil, Motrin), or naproxen (Aleve). Read and follow all instructions on the label. · Before you use cough and cold medicines, check the label. These medicines may not be safe for young children or for people with certain health problems. · Be careful when taking over-the-counter cold or flu medicines and Tylenol at the same time. Many of these medicines have acetaminophen, which is Tylenol. Read the labels to make sure that you are not taking more than the recommended dose.  Too much acetaminophen (Tylenol) can be harmful. · Get plenty of rest.  · Do not smoke or allow others to smoke around you. If you need help quitting, talk to your doctor about stop-smoking programs and medicines. These can increase your chances of quitting for good. When should you call for help? Call 911 anytime you think you may need emergency care. For example, call if:  ? · You have severe trouble breathing. ?Call your doctor now or seek immediate medical care if:  ? · You seem to be getting much sicker. ? · You have new or worse trouble breathing. ? · You have a new or higher fever. ? · You have a new rash. ? Watch closely for changes in your health, and be sure to contact your doctor if:  ? · You have a new symptom, such as a sore throat, an earache, or sinus pain. ? · You cough more deeply or more often, especially if you notice more mucus or a change in the color of your mucus. ? · You do not get better as expected. Where can you learn more? Go to http://georgette-leslie.info/. Enter E939 in the search box to learn more about \"Upper Respiratory Infection (Cold): Care Instructions. \"  Current as of: May 12, 2017  Content Version: 11.4  © 6779-8558 [employer]. Care instructions adapted under license by [employer] (which disclaims liability or warranty for this information). If you have questions about a medical condition or this instruction, always ask your healthcare professional. [place] [address] any warranty or liability for your use of this information.

## 2018-02-12 NOTE — PROGRESS NOTES
Chief Complaint   Patient presents with    Cough    Sore Throat     x 1 day       HPI:    This is a 48 y/o female  patient who presents with cold symptoms  Coughing off and on but became worse yesterday. Unable to sleep yesterday due to persistent coughing. Cough is sometime productive. No fever, SOB or chest pain. Complain of headache. ROS: pertinent positives as noted in HPI. All others were negative    >pmh  Social History     Social History    Marital status: SINGLE     Spouse name: N/A    Number of children: N/A    Years of education: N/A     Occupational History    Not on file. Social History Main Topics    Smoking status: Never Smoker    Smokeless tobacco: Never Used    Alcohol use No    Drug use: No    Sexual activity: Not on file     Other Topics Concern    Not on file     Social History Narrative     Current Outpatient Prescriptions   Medication Sig Dispense Refill    benzonatate (TESSALON) 200 mg capsule Take 1 Cap by mouth three (3) times daily as needed for Cough for up to 7 days. 21 Cap 0    azithromycin (ZITHROMAX) 250 mg tablet Take 2 tablets today, then take 1 tablet daily 6 Tab 0    metFORMIN (GLUCOPHAGE) 1,000 mg tablet TAKE ONE TABLET BY MOUTH TWICE DAILY WITH MEALS  Indications: type 2 diabetes mellitus 180 Tab 0    SITagliptin (JANUVIA) 100 mg tablet Take 1 Tab by mouth daily. 30 Tab 6    lovastatin (MEVACOR) 20 mg tablet Take 2 Tabs by mouth nightly. 180 Tab 2    losartan (COZAAR) 50 mg tablet Take 1 Tab by mouth daily. 90 Tab 3    aspirin (ASPIRIN) 325 mg tablet Take 325 mg by mouth daily.  HYDROcodone-acetaminophen (XODOL)  mg tab per tablet Take  by mouth.  ondansetron (ZOFRAN ODT) 4 mg disintegrating tablet Take 1 Tab by mouth every eight (8) hours as needed for Nausea. 12 Tab 0    amoxicillin (AMOXIL) 500 mg capsule 1 Cap.       ibuprofen (MOTRIN) 800 mg tablet          Allergies   Allergen Reactions    Lisinopril Cough           Physical Exam:    Vital Signs:   Visit Vitals    /81    Pulse 88    Temp 98.2 °F (36.8 °C) (Oral)    Resp 16    Ht 4' 11\" (1.499 m)    Wt 135 lb (61.2 kg)    LMP 03/15/2015    BMI 27.27 kg/m2       Past Medical History:   Diagnosis Date    Environmental allergies     Headache(784.0)     hx migraines    HTN (hypertension) 9/23/2013    Lacunar infarct, acute ([facility] [address] 2/17/16    admittted at [facility] 2/17-2/19    Stroke [facility])     tia 9/10/13    Type II or unspecified type diabetes mellitus without mention of complication, not stated as uncontrolled 10/10/2013     Social History     Social History    Marital status: SINGLE     Spouse name: N/A    Number of children: N/A    Years of education: N/A     Occupational History    Not on file. Social History Main Topics    Smoking status: Never Smoker    Smokeless tobacco: Never Used    Alcohol use No    Drug use: No    Sexual activity: Not on file     Other Topics Concern    Not on file     Social History Narrative     Current Outpatient Prescriptions   Medication Sig Dispense Refill    metFORMIN (GLUCOPHAGE) 1,000 mg tablet TAKE ONE TABLET BY MOUTH TWICE DAILY WITH MEALS  Indications: type 2 diabetes mellitus 180 Tab 0    SITagliptin (JANUVIA) 100 mg tablet Take 1 Tab by mouth daily. 30 Tab 6    lovastatin (MEVACOR) 20 mg tablet Take 2 Tabs by mouth nightly. 180 Tab 2    losartan (COZAAR) 50 mg tablet Take 1 Tab by mouth daily. 90 Tab 3    aspirin (ASPIRIN) 325 mg tablet Take 325 mg by mouth daily.  HYDROcodone-acetaminophen (XODOL)  mg tab per tablet Take  by mouth.  ondansetron (ZOFRAN ODT) 4 mg disintegrating tablet Take 1 Tab by mouth every eight (8) hours as needed for Nausea. 12 Tab 0    amoxicillin (AMOXIL) 500 mg capsule 1 Cap.       ibuprofen (MOTRIN) 800 mg tablet        Allergies   Allergen Reactions    Lisinopril Cough         General: a, a & o x 3, afebrile,  interacting appropriately, in no acute distress  HEENT: head NC/AT, conj. clear,  pharynx and tonsils with no erythema or exudates, no sinus tenderness  Respiratory: lung sounds clear bilaterally,  no wheezes or crackles  Cardiovascular: normal S1S2, regular rate and rhythm, no murmurs      Assessment/Plan:      ICD-10-CM ICD-9-CM    1. Acute nasopharyngitis [id number] [id number] benzonatate (TESSALON) 200 mg capsule      azithromycin (ZITHROMAX) 250 mg tablet    Plenty of fluid and rest           Additional Notes: Discussed today's diagnosis, treatment plans. Discussed medication indications and side effects. After Visit Summary: Provided and discussed printed patient instructions. Answered questions in relation to today's diagnosis.   Follow-up Disposition: as needed          James Acharya NP-BC  Family Medicine  [facility]        Orders Placed This Encounter    benzonatate (TESSALON) 200 mg capsule    azithromycin (ZITHROMAX) 250 mg tablet

## 2018-02-12 NOTE — PROGRESS NOTES
1. Have you been to the ER, urgent care clinic since your last visit? Hospitalized since your last visit? No    2. Have you seen or consulted any other health care providers outside of the [address] [place] [address] since your last visit? Include any pap smears or colon screening.  No

## 2018-04-25 ENCOUNTER — OFFICE VISIT (OUTPATIENT)
Dept: FAMILY MEDICINE CLINIC | Age: 53
End: 2018-04-25

## 2018-04-25 VITALS
TEMPERATURE: 96.3 F | SYSTOLIC BLOOD PRESSURE: 119 MMHG | WEIGHT: 136 LBS | DIASTOLIC BLOOD PRESSURE: 82 MMHG | BODY MASS INDEX: 27.42 KG/M2 | OXYGEN SATURATION: 99 % | HEIGHT: 59 IN | HEART RATE: 67 BPM | RESPIRATION RATE: 18 BRPM

## 2018-04-25 DIAGNOSIS — Z12.11 SCREENING FOR COLON CANCER: ICD-10-CM

## 2018-04-25 DIAGNOSIS — R14.0 ABDOMINAL BLOATING WITH CRAMPS: Primary | ICD-10-CM

## 2018-04-25 DIAGNOSIS — R10.9 ABDOMINAL BLOATING WITH CRAMPS: Primary | ICD-10-CM

## 2018-04-25 NOTE — MR AVS SNAPSHOT
Ruddy Gilbert [place] [address] [facility] [address]. [address] [facility] 83 [address] 
[phone] Patient: Naveen Wilkerson MRN: [id number] :[id number] Visit Information Date & Time Provider Department Dept. Phone Encounter #  
 2018 10:30 AM Deb Moreno NP [address] [place] [address] [id number] Follow-up Instructions Return if symptoms worsen or fail to improve. Upcoming Health Maintenance Date Due Pneumococcal 19-64 Medium Risk (1 of 1 - PPSV23) 1985 DTaP/Tdap/Td series (1 - Tdap) 1987 PAP AKA CERVICAL CYTOLOGY 6/15/2013 FOOT EXAM Q1 2016 BREAST CANCER SCRN MAMMOGRAM 2016 FOBT Q 1 YEAR AGE 50-75 2016 EYE EXAM RETINAL OR DILATED Q1 2017 Influenza Age 5 to Adult 2017 HEMOGLOBIN A1C Q6M 2018 MICROALBUMIN Q1 2018 LIPID PANEL Q1 2018 Allergies as of 2018  Review Complete On: 2018 By: Ann Marie Evans LPN Severity Noted Reaction Type Reactions Lisinopril Medium 2014   Intolerance Cough Current Immunizations  Reviewed on 3/3/2017 No immunizations on file. Not reviewed this visit You Were Diagnosed With   
  
 Codes Comments Abdominal bloating with cramps    -  Primary ICD-10-CM: R14.0, R10.9 ICD-9-CM: 787.3, 789.00 Screening for colon cancer     ICD-10-CM: Z12.11 ICD-9-CM: V76.51 Vitals BP Pulse Temp Resp Height(growth percentile) Weight(growth percentile) 119/82 (BP 1 Location: Left arm, BP Patient Position: Sitting) 67 96.3 °F (35.7 °C) (Oral) 18 4' 11\" (1.499 m) 136 lb (61.7 kg) LMP SpO2 BMI OB Status Smoking Status 03/15/2015 99% 27.47 kg/m2 Postmenopausal Never Smoker Vitals History BMI and BSA Data Body Mass Index Body Surface Area  
 27.47 kg/m 2 1.6 m 2 Preferred Pharmacy Pharmacy Name Phone [address], [address] [phone] Your Updated Medication List  
  
   
This list is accurate as of 4/25/18 11:08 AM.  Always use your most recent med list.  
  
  
  
  
 amoxicillin 500 mg capsule Commonly known as:  AMOXIL  
1 Cap. aspirin 325 mg tablet Commonly known as:  ASPIRIN Take 325 mg by mouth daily. HYDROcodone-acetaminophen  mg Tab per tablet Commonly known as:  Carlus Prior Take  by mouth. ibuprofen 800 mg tablet Commonly known as:  MOTRIN  
as needed. losartan 50 mg tablet Commonly known as:  COZAAR Take 1 Tab by mouth daily. lovastatin 20 mg tablet Commonly known as:  MEVACOR Take 2 Tabs by mouth nightly. metFORMIN 1,000 mg tablet Commonly known as:  GLUCOPHAGE  
TAKE ONE TABLET BY MOUTH TWICE DAILY WITH MEALS  Indications: type 2 diabetes mellitus  
  
 ondansetron 4 mg disintegrating tablet Commonly known as:  ZOFRAN ODT Take 1 Tab by mouth every eight (8) hours as needed for Nausea. SITagliptin 100 mg tablet Commonly known as:  [place] Fast Take 1 Tab by mouth daily. We Performed the Following REFERRAL FOR COLONOSCOPY [AKM320 Custom] Comments:  
 Please evaluate patient for screening for colonoscopy REFERRAL TO GASTROENTEROLOGY [IFR55 Custom] Comments:  
 Please evaluate patient for abdominal cramps and bloating Follow-up Instructions Return if symptoms worsen or fail to improve. Referral Information Referral ID Referred By Referred To  
  
 [id number] Rossana Hameed Not Available Visits Status Start Date End Date 1 New Request 4/25/18 4/25/19 If your referral has a status of pending review or denied, additional information will be sent to support the outcome of this decision. Referral ID Referred By Referred To  
 [id number] Rossana Hameed Not Available Visits Status Start Date End Date 1 New Request 4/25/18 4/25/19 If your referral has a status of pending review or denied, additional information will be sent to support the outcome of this decision. Patient Instructions Abdominal Pain: Care Instructions Your Care Instructions Abdominal pain has many possible causes. Some aren't serious and get better on their own in a few days. Others need more testing and treatment. If your pain continues or gets worse, you need to be rechecked and may need more tests to find out what is wrong. You may need surgery to correct the problem. Don't ignore new symptoms, such as fever, nausea and vomiting, urination problems, pain that gets worse, and dizziness. These may be signs of a more serious problem. Your doctor may have recommended a follow-up visit in the next 8 to 12 hours. If you are not getting better, you may need more tests or treatment. The doctor has checked you carefully, but problems can develop later. If you notice any problems or new symptoms, get medical treatment right away. Follow-up care is a key part of your treatment and safety. Be sure to make and go to all appointments, and call your doctor if you are having problems. It's also a good idea to know your test results and keep a list of the medicines you take. How can you care for yourself at home? · Rest until you feel better. · To prevent dehydration, drink plenty of fluids, enough so that your urine is light yellow or clear like water. Choose water and other caffeine-free clear liquids until you feel better. If you have kidney, heart, or liver disease and have to limit fluids, talk with your doctor before you increase the amount of fluids you drink. · If your stomach is upset, eat mild foods, such as rice, dry toast or crackers, bananas, and applesauce. Try eating several small meals instead of two or three large ones.  
· Wait until 48 hours after all symptoms have gone away before you have spicy foods, alcohol, and drinks that contain caffeine. · Do not eat foods that are high in fat. · Avoid anti-inflammatory medicines such as aspirin, ibuprofen (Advil, Motrin), and naproxen (Aleve). These can cause stomach upset. Talk to your doctor if you take daily aspirin for another health problem. When should you call for help? Call 911 anytime you think you may need emergency care. For example, call if: 
? · You passed out (lost consciousness). ? · You pass maroon or very bloody stools. ? · You vomit blood or what looks like coffee grounds. ? · You have new, severe belly pain. ?Call your doctor now or seek immediate medical care if: 
? · Your pain gets worse, especially if it becomes focused in one area of your belly. ? · You have a new or higher fever. ? · Your stools are black and look like tar, or they have streaks of blood. ? · You have unexpected vaginal bleeding. ? · You have symptoms of a urinary tract infection. These may include: 
¨ Pain when you urinate. ¨ Urinating more often than usual. 
¨ Blood in your urine. ? · You are dizzy or lightheaded, or you feel like you may faint. ? Watch closely for changes in your health, and be sure to contact your doctor if: 
? · You are not getting better after 1 day (24 hours). Where can you learn more? Go to http://georgette-leslie.info/. Enter K911 in the search box to learn more about \"Abdominal Pain: Care Instructions. \" Current as of: March 20, 2017 Content Version: 11.4 © 3215-7304 joiz. Care instructions adapted under license by [employer] (which disclaims liability or warranty for this information). If you have questions about a medical condition or this instruction, always ask your healthcare professional. [address] any warranty or liability for your use of this information. Abdominal Pain: Care Instructions Your Care Instructions Abdominal pain has many possible causes. Some aren't serious and get better on their own in a few days. Others need more testing and treatment. If your pain continues or gets worse, you need to be rechecked and may need more tests to find out what is wrong. You may need surgery to correct the problem. Don't ignore new symptoms, such as fever, nausea and vomiting, urination problems, pain that gets worse, and dizziness. These may be signs of a more serious problem. Your doctor may have recommended a follow-up visit in the next 8 to 12 hours. If you are not getting better, you may need more tests or treatment. The doctor has checked you carefully, but problems can develop later. If you notice any problems or new symptoms, get medical treatment right away. Follow-up care is a key part of your treatment and safety. Be sure to make and go to all appointments, and call your doctor if you are having problems. It's also a good idea to know your test results and keep a list of the medicines you take. How can you care for yourself at home? · Rest until you feel better. · To prevent dehydration, drink plenty of fluids, enough so that your urine is light yellow or clear like water. Choose water and other caffeine-free clear liquids until you feel better. If you have kidney, heart, or liver disease and have to limit fluids, talk with your doctor before you increase the amount of fluids you drink. · If your stomach is upset, eat mild foods, such as rice, dry toast or crackers, bananas, and applesauce. Try eating several small meals instead of two or three large ones. · Wait until 48 hours after all symptoms have gone away before you have spicy foods, alcohol, and drinks that contain caffeine. · Do not eat foods that are high in fat. · Avoid anti-inflammatory medicines such as aspirin, ibuprofen (Advil, Motrin), and naproxen (Aleve). These can cause stomach upset.  Talk to your doctor if you take daily aspirin for another health problem. When should you call for help? Call 911 anytime you think you may need emergency care. For example, call if: 
? · You passed out (lost consciousness). ? · You pass maroon or very bloody stools. ? · You vomit blood or what looks like coffee grounds. ? · You have new, severe belly pain. ?Call your doctor now or seek immediate medical care if: 
? · Your pain gets worse, especially if it becomes focused in one area of your belly. ? · You have a new or higher fever. ? · Your stools are black and look like tar, or they have streaks of blood. ? · You have unexpected vaginal bleeding. ? · You have symptoms of a urinary tract infection. These may include: 
¨ Pain when you urinate. ¨ Urinating more often than usual. 
¨ Blood in your urine. ? · You are dizzy or lightheaded, or you feel like you may faint. ? Watch closely for changes in your health, and be sure to contact your doctor if: 
? · You are not getting better after 1 day (24 hours). Where can you learn more? Go to http://georgette-leslie.info/. Enter M199 in the search box to learn more about \"Abdominal Pain: Care Instructions. \" Current as of: March 20, 2017 Content Version: 11.4 © 9916-1301 Jaxtr. Care instructions adapted under license by [employer] (which disclaims liability or warranty for this information). If you have questions about a medical condition or this instruction, always ask your healthcare professional. [place] [address] any warranty or liability for your use of this information. Gas and Bloating: Care Instructions Your Care Instructions Gas and bloating can be uncomfortable and embarrassing problems. All people pass gas, but some people produce more gas than others, sometimes enough to cause distress.  It is normal to pass gas from 6 to 20 times per day. Excess gas usually is not caused by a serious health problem. Gas and bloating usually are caused by something you eat or drink, including some food supplements and medicines. Gas and bloating are usually harmless and go away without treatment. However, changing your diet can help end the problem. Some over-the-counter medicines can help prevent gas and relieve bloating. Follow-up care is a key part of your treatment and safety. Be sure to make and go to all appointments, and call your doctor if you are having problems. It's also a good idea to know your test results and keep a list of the medicines you take. How can you care for yourself at home? · Keep a food diary if you think a food gives you gas. Write down what you eat or drink. Also record when you get gas. If you notice that a food seems to cause your gas each time, avoid it and see if the gas goes away. Examples of foods that cause gas include: ¨ Fried and fatty foods. ¨ Beans. ¨ Vegetables such as artichokes, asparagus, broccoli, brussels sprouts, cabbage, cauliflower, cucumbers, green peppers, onions, peas, radishes, and raw potatoes. ¨ Fruits such as apricots, bananas, melons, peaches, pears, prunes, and raw apples. ¨ Wheat and wheat bran. · Soak dry beans in water overnight, then dump the water and cook the soaked beans in new water. This can help prevent gas and bloating. · If you have problems with lactose, avoid dairy products such as milk and cheese. · Try not to swallow air. Do not drink through a straw, gulp your food, or chew gum. · Take an over-the-counter medicine. Read and follow all instructions on the label. ¨ Food enzymes, such as Beano, can be added to gas-producing foods to prevent gas. ¨ Antacids, such as Maalox Anti-Gas and Mylanta Gas, can relieve bloating by making you burp. Be careful when you take over-the-counter antacid medicines. Many of these medicines have aspirin in them.  Read the label to make sure that you are not taking more than the recommended dose. Too much aspirin can be harmful. ¨ Activated charcoal tablets, such as CharcoCaps, may decrease odor from gas you pass. ¨ If you have problems with lactose, you can take medicines such as Dairy Ease and Lactaid with dairy products to prevent gas and bloating. · Get some exercise regularly. When should you call for help? Call 911 anytime you think you may need emergency care. For example, call if: 
? · You have gas and signs of a heart attack, such as: ¨ Chest pain or pressure. ¨ Sweating. ¨ Shortness of breath. ¨ Nausea or vomiting. ¨ Pain that spreads from the chest to the neck, jaw, or one or both shoulders or arms. ¨ Dizziness or lightheadedness. ¨ A fast or uneven pulse. After calling 911, chew 1 adult-strength aspirin. Wait for an ambulance. Do not try to drive yourself. ?Call your doctor now or seek immediate medical care if: 
? · You have severe belly pain. ? · You have blood in your stool. ? Watch closely for changes in your health, and be sure to contact your doctor if: 
? · You have blood or pus in your urine. ? · Your urine is cloudy or smells bad.  
? · You are burping and have trouble swallowing. ? · You feel bloated and have swelling in your belly. ? · You do not get better as expected. Where can you learn more? Go to http://georgette-leslie.info/. Enter Y602 in the search box to learn more about \"Gas and Bloating: Care Instructions. \" Current as of: March 20, 2017 Content Version: 11.4 © 5772-6272 Dentalink. Care instructions adapted under license by "[employer] (which disclaims liability or warranty for this information). If you have questions about a medical condition or this instruction, always ask your healthcare professional. [address] any warranty or liability for your use of this information. Gas and Bloating: Care Instructions Your Care Instructions Gas and bloating can be uncomfortable and embarrassing problems. All people pass gas, but some people produce more gas than others, sometimes enough to cause distress. It is normal to pass gas from 6 to 20 times per day. Excess gas usually is not caused by a serious health problem. Gas and bloating usually are caused by something you eat or drink, including some food supplements and medicines. Gas and bloating are usually harmless and go away without treatment. However, changing your diet can help end the problem. Some over-the-counter medicines can help prevent gas and relieve bloating. Follow-up care is a key part of your treatment and safety. Be sure to make and go to all appointments, and call your doctor if you are having problems. It's also a good idea to know your test results and keep a list of the medicines you take. How can you care for yourself at home? · Keep a food diary if you think a food gives you gas. Write down what you eat or drink. Also record when you get gas. If you notice that a food seems to cause your gas each time, avoid it and see if the gas goes away. Examples of foods that cause gas include: ¨ Fried and fatty foods. ¨ Beans. ¨ Vegetables such as artichokes, asparagus, broccoli, brussels sprouts, cabbage, cauliflower, cucumbers, green peppers, onions, peas, radishes, and raw potatoes. ¨ Fruits such as apricots, bananas, melons, peaches, pears, prunes, and raw apples. ¨ Wheat and wheat bran. · Soak dry beans in water overnight, then dump the water and cook the soaked beans in new water. This can help prevent gas and bloating. · If you have problems with lactose, avoid dairy products such as milk and cheese. · Try not to swallow air. Do not drink through a straw, gulp your food, or chew gum. · Take an over-the-counter medicine. Read and follow all instructions on the label.  
¨ Food enzymes, such as Beano, can be added to gas-producing foods to prevent gas. ¨ Antacids, such as Maalox Anti-Gas and Mylanta Gas, can relieve bloating by making you burp. Be careful when you take over-the-counter antacid medicines. Many of these medicines have aspirin in them. Read the label to make sure that you are not taking more than the recommended dose. Too much aspirin can be harmful. ¨ Activated charcoal tablets, such as CharcoCaps, may decrease odor from gas you pass. ¨ If you have problems with lactose, you can take medicines such as Dairy Ease and Lactaid with dairy products to prevent gas and bloating. · Get some exercise regularly. When should you call for help? Call 911 anytime you think you may need emergency care. For example, call if: 
? · You have gas and signs of a heart attack, such as: ¨ Chest pain or pressure. ¨ Sweating. ¨ Shortness of breath. ¨ Nausea or vomiting. ¨ Pain that spreads from the chest to the neck, jaw, or one or both shoulders or arms. ¨ Dizziness or lightheadedness. ¨ A fast or uneven pulse. After calling 911, chew 1 adult-strength aspirin. Wait for an ambulance. Do not try to drive yourself. ?Call your doctor now or seek immediate medical care if: 
? · You have severe belly pain. ? · You have blood in your stool. ? Watch closely for changes in your health, and be sure to contact your doctor if: 
? · You have blood or pus in your urine. ? · Your urine is cloudy or smells bad.  
? · You are burping and have trouble swallowing. ? · You feel bloated and have swelling in your belly. ? · You do not get better as expected. Where can you learn more? Go to http://georgette-leslie.info/. Enter C884 in the search box to learn more about \"Gas and Bloating: Care Instructions. \" Current as of: March 20, 2017 Content Version: 11.4 © 7821-7886 [employer].  Care instructions adapted under license by [employer] (which disclaims liability or warranty for this information). If you have questions about a medical condition or this instruction, always ask your healthcare professional. [address] any warranty or liability for your use of this information. Introducing [facility]! Dear Renetta Matthew: 
Thank you for requesting a LYYN account. Our records indicate that you already have an active LYYN account. You can access your account anytime at [email or web address] are family too. [email or web address] are family too Did you know that you can access your hospital and ER discharge instructions at any time in LYYN? You can also review all of your test results from your hospital stay or ER visit. Additional Information If you have questions, please visit the Frequently Asked Questions section of the LYYN website at [email or web address] are family too/. Remember, LYYN is NOT to be used for urgent needs. For medical emergencies, dial 911. Now available from your iPhone and Android! Please provide this summary of care documentation to your next provider. Your primary care clinician is listed as Pedro Luis March. If you have any questions after today's visit, please call [phone].

## 2018-04-25 NOTE — PROGRESS NOTES
Chief Complaint   Patient presents with    Abdominal Pain     1. Have you been to the ER, urgent care clinic since your last visit? Hospitalized since your last visit? No    2. Have you seen or consulted any other health care providers outside of the [address] [place] [address] since your last visit? Include any pap smears or colon screening.  No

## 2018-04-25 NOTE — PATIENT INSTRUCTIONS
Abdominal Pain: Care Instructions  Your Care Instructions    Abdominal pain has many possible causes. Some aren't serious and get better on their own in a few days. Others need more testing and treatment. If your pain continues or gets worse, you need to be rechecked and may need more tests to find out what is wrong. You may need surgery to correct the problem. Don't ignore new symptoms, such as fever, nausea and vomiting, urination problems, pain that gets worse, and dizziness. These may be signs of a more serious problem. Your doctor may have recommended a follow-up visit in the next 8 to 12 hours. If you are not getting better, you may need more tests or treatment. The doctor has checked you carefully, but problems can develop later. If you notice any problems or new symptoms, get medical treatment right away. Follow-up care is a key part of your treatment and safety. Be sure to make and go to all appointments, and call your doctor if you are having problems. It's also a good idea to know your test results and keep a list of the medicines you take. How can you care for yourself at home? · Rest until you feel better. · To prevent dehydration, drink plenty of fluids, enough so that your urine is light yellow or clear like water. Choose water and other caffeine-free clear liquids until you feel better. If you have kidney, heart, or liver disease and have to limit fluids, talk with your doctor before you increase the amount of fluids you drink. · If your stomach is upset, eat mild foods, such as rice, dry toast or crackers, bananas, and applesauce. Try eating several small meals instead of two or three large ones. · Wait until 48 hours after all symptoms have gone away before you have spicy foods, alcohol, and drinks that contain caffeine. · Do not eat foods that are high in fat. · Avoid anti-inflammatory medicines such as aspirin, ibuprofen (Advil, Motrin), and naproxen (Aleve).  These can cause stomach upset. Talk to your doctor if you take daily aspirin for another health problem. When should you call for help? Call 911 anytime you think you may need emergency care. For example, call if:  ? · You passed out (lost consciousness). ? · You pass maroon or very bloody stools. ? · You vomit blood or what looks like coffee grounds. ? · You have new, severe belly pain. ?Call your doctor now or seek immediate medical care if:  ? · Your pain gets worse, especially if it becomes focused in one area of your belly. ? · You have a new or higher fever. ? · Your stools are black and look like tar, or they have streaks of blood. ? · You have unexpected vaginal bleeding. ? · You have symptoms of a urinary tract infection. These may include:  ¨ Pain when you urinate. ¨ Urinating more often than usual.  ¨ Blood in your urine. ? · You are dizzy or lightheaded, or you feel like you may faint. ? Watch closely for changes in your health, and be sure to contact your doctor if:  ? · You are not getting better after 1 day (24 hours). Where can you learn more? Go to http://georgetteQurileslie.info/. Enter K670 in the search box to learn more about \"Abdominal Pain: Care Instructions. \"  Current as of: March 20, 2017  Content Version: 11.4  © 4209-5196 [employer]. Care instructions adapted under license by [employer] (which disclaims liability or warranty for this information). If you have questions about a medical condition or this instruction, always ask your healthcare professional. [place] [address] any warranty or liability for your use of this information. Abdominal Pain: Care Instructions  Your Care Instructions    Abdominal pain has many possible causes. Some aren't serious and get better on their own in a few days. Others need more testing and treatment.  If your pain continues or gets worse, you need to be rechecked and may need more tests to find out what is wrong. You may need surgery to correct the problem. Don't ignore new symptoms, such as fever, nausea and vomiting, urination problems, pain that gets worse, and dizziness. These may be signs of a more serious problem. Your doctor may have recommended a follow-up visit in the next 8 to 12 hours. If you are not getting better, you may need more tests or treatment. The doctor has checked you carefully, but problems can develop later. If you notice any problems or new symptoms, get medical treatment right away. Follow-up care is a key part of your treatment and safety. Be sure to make and go to all appointments, and call your doctor if you are having problems. It's also a good idea to know your test results and keep a list of the medicines you take. How can you care for yourself at home? · Rest until you feel better. · To prevent dehydration, drink plenty of fluids, enough so that your urine is light yellow or clear like water. Choose water and other caffeine-free clear liquids until you feel better. If you have kidney, heart, or liver disease and have to limit fluids, talk with your doctor before you increase the amount of fluids you drink. · If your stomach is upset, eat mild foods, such as rice, dry toast or crackers, bananas, and applesauce. Try eating several small meals instead of two or three large ones. · Wait until 48 hours after all symptoms have gone away before you have spicy foods, alcohol, and drinks that contain caffeine. · Do not eat foods that are high in fat. · Avoid anti-inflammatory medicines such as aspirin, ibuprofen (Advil, Motrin), and naproxen (Aleve). These can cause stomach upset. Talk to your doctor if you take daily aspirin for another health problem. When should you call for help? Call 911 anytime you think you may need emergency care. For example, call if:  ? · You passed out (lost consciousness). ? · You pass maroon or very bloody stools.    ? · You vomit blood or what looks like coffee grounds. ? · You have new, severe belly pain. ?Call your doctor now or seek immediate medical care if:  ? · Your pain gets worse, especially if it becomes focused in one area of your belly. ? · You have a new or higher fever. ? · Your stools are black and look like tar, or they have streaks of blood. ? · You have unexpected vaginal bleeding. ? · You have symptoms of a urinary tract infection. These may include:  ¨ Pain when you urinate. ¨ Urinating more often than usual.  ¨ Blood in your urine. ? · You are dizzy or lightheaded, or you feel like you may faint. ? Watch closely for changes in your health, and be sure to contact your doctor if:  ? · You are not getting better after 1 day (24 hours). Where can you learn more? Go to http://georgetteWorkSimpleleslie.info/. Enter X600 in the search box to learn more about \"Abdominal Pain: Care Instructions. \"  Current as of: March 20, 2017  Content Version: 11.4  © 5475-7052 [employer]. Care instructions adapted under license by [employer] (which disclaims liability or warranty for this information). If you have questions about a medical condition or this instruction, always ask your healthcare professional. [address] any warranty or liability for your use of this information. Gas and Bloating: Care Instructions  Your Care Instructions    Gas and bloating can be uncomfortable and embarrassing problems. All people pass gas, but some people produce more gas than others, sometimes enough to cause distress. It is normal to pass gas from 6 to 20 times per day. Excess gas usually is not caused by a serious health problem. Gas and bloating usually are caused by something you eat or drink, including some food supplements and medicines. Gas and bloating are usually harmless and go away without treatment. However, changing your diet can help end the problem.  Some over-the-counter medicines can help prevent gas and relieve bloating. Follow-up care is a key part of your treatment and safety. Be sure to make and go to all appointments, and call your doctor if you are having problems. It's also a good idea to know your test results and keep a list of the medicines you take. How can you care for yourself at home? · Keep a food diary if you think a food gives you gas. Write down what you eat or drink. Also record when you get gas. If you notice that a food seems to cause your gas each time, avoid it and see if the gas goes away. Examples of foods that cause gas include:  ¨ Fried and fatty foods. ¨ Beans. ¨ Vegetables such as artichokes, asparagus, broccoli, brussels sprouts, cabbage, cauliflower, cucumbers, green peppers, onions, peas, radishes, and raw potatoes. ¨ Fruits such as apricots, bananas, melons, peaches, pears, prunes, and raw apples. ¨ Wheat and wheat bran. · Soak dry beans in water overnight, then dump the water and cook the soaked beans in new water. This can help prevent gas and bloating. · If you have problems with lactose, avoid dairy products such as milk and cheese. · Try not to swallow air. Do not drink through a straw, gulp your food, or chew gum. · Take an over-the-counter medicine. Read and follow all instructions on the label. ¨ Food enzymes, such as Beano, can be added to gas-producing foods to prevent gas. ¨ Antacids, such as Maalox Anti-Gas and Mylanta Gas, can relieve bloating by making you burp. Be careful when you take over-the-counter antacid medicines. Many of these medicines have aspirin in them. Read the label to make sure that you are not taking more than the recommended dose. Too much aspirin can be harmful. ¨ Activated charcoal tablets, such as CharcoCaps, may decrease odor from gas you pass.   ¨ If you have problems with lactose, you can take medicines such as Dairy Ease and Lactaid with dairy products to prevent gas and bloating. · Get some exercise regularly. When should you call for help? Call 911 anytime you think you may need emergency care. For example, call if:  ? · You have gas and signs of a heart attack, such as:  ¨ Chest pain or pressure. ¨ Sweating. ¨ Shortness of breath. ¨ Nausea or vomiting. ¨ Pain that spreads from the chest to the neck, jaw, or one or both shoulders or arms. ¨ Dizziness or lightheadedness. ¨ A fast or uneven pulse. After calling 911, chew 1 adult-strength aspirin. Wait for an ambulance. Do not try to drive yourself. ?Call your doctor now or seek immediate medical care if:  ? · You have severe belly pain. ? · You have blood in your stool. ? Watch closely for changes in your health, and be sure to contact your doctor if:  ? · You have blood or pus in your urine. ? · Your urine is cloudy or smells bad.   ? · You are burping and have trouble swallowing. ? · You feel bloated and have swelling in your belly. ? · You do not get better as expected. Where can you learn more? Go to http://georgette-leslie.info/. Enter H568 in the search box to learn more about \"Gas and Bloating: Care Instructions. \"  Current as of: March 20, 2017  Content Version: 11.4  © 5892-5129 [employer]. Care instructions adapted under license by [employer] (which disclaims liability or warranty for this information). If you have questions about a medical condition or this instruction, always ask your healthcare professional. [place] [address] any warranty or liability for your use of this information. Gas and Bloating: Care Instructions  Your Care Instructions    Gas and bloating can be uncomfortable and embarrassing problems. All people pass gas, but some people produce more gas than others, sometimes enough to cause distress. It is normal to pass gas from 6 to 20 times per day. Excess gas usually is not caused by a serious health problem.   Gas and bloating usually are caused by something you eat or drink, including some food supplements and medicines. Gas and bloating are usually harmless and go away without treatment. However, changing your diet can help end the problem. Some over-the-counter medicines can help prevent gas and relieve bloating. Follow-up care is a key part of your treatment and safety. Be sure to make and go to all appointments, and call your doctor if you are having problems. It's also a good idea to know your test results and keep a list of the medicines you take. How can you care for yourself at home? · Keep a food diary if you think a food gives you gas. Write down what you eat or drink. Also record when you get gas. If you notice that a food seems to cause your gas each time, avoid it and see if the gas goes away. Examples of foods that cause gas include:  ¨ Fried and fatty foods. ¨ Beans. ¨ Vegetables such as artichokes, asparagus, broccoli, brussels sprouts, cabbage, cauliflower, cucumbers, green peppers, onions, peas, radishes, and raw potatoes. ¨ Fruits such as apricots, bananas, melons, peaches, pears, prunes, and raw apples. ¨ Wheat and wheat bran. · Soak dry beans in water overnight, then dump the water and cook the soaked beans in new water. This can help prevent gas and bloating. · If you have problems with lactose, avoid dairy products such as milk and cheese. · Try not to swallow air. Do not drink through a straw, gulp your food, or chew gum. · Take an over-the-counter medicine. Read and follow all instructions on the label. ¨ Food enzymes, such as Beano, can be added to gas-producing foods to prevent gas. ¨ Antacids, such as Maalox Anti-Gas and Mylanta Gas, can relieve bloating by making you burp. Be careful when you take over-the-counter antacid medicines. Many of these medicines have aspirin in them. Read the label to make sure that you are not taking more than the recommended dose.  Too much aspirin can be harmful. ¨ Activated charcoal tablets, such as CharcoCaps, may decrease odor from gas you pass. ¨ If you have problems with lactose, you can take medicines such as Dairy Ease and Lactaid with dairy products to prevent gas and bloating. · Get some exercise regularly. When should you call for help? Call 911 anytime you think you may need emergency care. For example, call if:  ? · You have gas and signs of a heart attack, such as:  ¨ Chest pain or pressure. ¨ Sweating. ¨ Shortness of breath. ¨ Nausea or vomiting. ¨ Pain that spreads from the chest to the neck, jaw, or one or both shoulders or arms. ¨ Dizziness or lightheadedness. ¨ A fast or uneven pulse. After calling 911, chew 1 adult-strength aspirin. Wait for an ambulance. Do not try to drive yourself. ?Call your doctor now or seek immediate medical care if:  ? · You have severe belly pain. ? · You have blood in your stool. ? Watch closely for changes in your health, and be sure to contact your doctor if:  ? · You have blood or pus in your urine. ? · Your urine is cloudy or smells bad.   ? · You are burping and have trouble swallowing. ? · You feel bloated and have swelling in your belly. ? · You do not get better as expected. Where can you learn more? Go to http://georgette-leslie.info/. Enter C441 in the search box to learn more about \"Gas and Bloating: Care Instructions. \"  Current as of: March 20, 2017  Content Version: 11.4  © 8230-5915 [email or web address]. Care instructions adapted under license by [employer] (which disclaims liability or warranty for this information). If you have questions about a medical condition or this instruction, always ask your healthcare professional. [place] [address] any warranty or liability for your use of this information.

## 2018-04-25 NOTE — PROGRESS NOTES
Chief Complaint   Patient presents with    Abdominal Pain    Bloated       HPI:    This is a 46 y/o female  patient who presents for the above symptoms    Abdominal bloating and cramp for two weeks. No urinary symptoms, fever, nausea or vomiting but complain of constipation. Patient states wants a referral to GI for the above and screening colonoscopy. ROS: pertinent positives as noted in HPI. All others were negative      Past Medical History:   Diagnosis Date    Environmental allergies     Headache(784.0)     hx migraines    HTN (hypertension) 9/23/2013    Lacunar infarct, acute ([facility] [address] 2/17/16    admittted at [facility]/17-2/19    Stroke [facility])     tia 9/10/13    Type II or unspecified type diabetes mellitus without mention of complication, not stated as uncontrolled 10/10/2013     Social History     Social History    Marital status: SINGLE     Spouse name: N/A    Number of children: N/A    Years of education: N/A     Occupational History    Not on file. Social History Main Topics    Smoking status: Never Smoker    Smokeless tobacco: Never Used    Alcohol use No    Drug use: No    Sexual activity: Yes     Partners: Male     Birth control/ protection: None     Other Topics Concern    Not on file     Social History Narrative     Current Outpatient Prescriptions   Medication Sig Dispense Refill    losartan (COZAAR) 50 mg tablet Take 1 Tab by mouth daily. 90 Tab 0    metFORMIN (GLUCOPHAGE) 1,000 mg tablet TAKE ONE TABLET BY MOUTH TWICE DAILY WITH MEALS  Indications: type 2 diabetes mellitus 180 Tab 0    SITagliptin (JANUVIA) 100 mg tablet Take 1 Tab by mouth daily. 30 Tab 6    lovastatin (MEVACOR) 20 mg tablet Take 2 Tabs by mouth nightly. 180 Tab 2    ondansetron (ZOFRAN ODT) 4 mg disintegrating tablet Take 1 Tab by mouth every eight (8) hours as needed for Nausea.  (Patient taking differently: Take 4 mg by mouth as needed for Nausea.) 12 Tab 0    ibuprofen (MOTRIN) 800 mg tablet as needed.  aspirin (ASPIRIN) 325 mg tablet Take 325 mg by mouth daily.  HYDROcodone-acetaminophen (XODOL)  mg tab per tablet Take  by mouth.  amoxicillin (AMOXIL) 500 mg capsule 1 Cap. Allergies   Allergen Reactions    Lisinopril Cough         Physical Exam:    Vital Signs:   Visit Vitals    /82 (BP 1 Location: Left arm, BP Patient Position: Sitting)    Pulse 67    Temp 96.3 °F (35.7 °C) (Oral)    Resp 18    Ht 4' 11\" (1.499 m)    Wt 136 lb (61.7 kg)    LMP 03/15/2015    SpO2 99%    BMI 27.47 kg/m2         General: a, a & o x 3, afebrile,  interacting appropriately, in no acute distress  Respiratory: lung sounds clear bilaterally,  no wheezes or crackles  Cardiovascular: normal S1S2, regular rate and rhythm, no murmurs  Abdominal: soft, non tender. Normal bowel sound      Assessment/Plan:      ICD-10-CM ICD-9-CM    1. Abdominal bloating with cramps R14.0 787.3 REFERRAL TO GASTROENTEROLOGY    R10.9 789.00    2. Screening for colon cancer Z12.11 V76.51 REFERRAL FOR COLONOSCOPY               Additional Notes: Discussed today's diagnosis, treatment plans. Discussed medication indications and side effects. After Visit Summary: Provided and discussed printed patient instructions. Answered questions in relation to today's diagnosis.   Follow-up Disposition: as needed          Erika Barnett NP-BC  Family Medicine  [facility]              Orders Placed This Encounter    REFERRAL FOR COLONOSCOPY    REFERRAL TO GASTROENTEROLOGY

## 2018-06-05 DIAGNOSIS — E11.65 TYPE 2 DIABETES MELLITUS WITH HYPERGLYCEMIA, WITHOUT LONG-TERM CURRENT USE OF INSULIN (HCC): ICD-10-CM

## 2018-06-06 RX ORDER — METFORMIN HYDROCHLORIDE 1000 MG/1
TABLET ORAL
Qty: 180 TAB | Refills: 0 | Status: SHIPPED | OUTPATIENT
Start: 2018-06-06 | End: 2018-06-28 | Stop reason: SDUPTHER

## 2018-06-28 DIAGNOSIS — E11.65 TYPE 2 DIABETES MELLITUS WITH HYPERGLYCEMIA, WITHOUT LONG-TERM CURRENT USE OF INSULIN (HCC): ICD-10-CM

## 2018-06-28 DIAGNOSIS — I10 ESSENTIAL HYPERTENSION WITH GOAL BLOOD PRESSURE LESS THAN 140/90: ICD-10-CM

## 2018-06-28 RX ORDER — METFORMIN HYDROCHLORIDE 1000 MG/1
TABLET ORAL
Qty: 180 TAB | Refills: 0 | Status: SHIPPED | OUTPATIENT
Start: 2018-06-28 | End: 2018-09-05 | Stop reason: SDUPTHER

## 2018-06-28 RX ORDER — LOSARTAN POTASSIUM 50 MG/1
50 TABLET ORAL DAILY
Qty: 90 TAB | Refills: 0 | Status: SHIPPED | OUTPATIENT
Start: 2018-06-28 | End: 2018-09-05 | Stop reason: SDUPTHER

## 2018-06-29 ENCOUNTER — TELEPHONE (OUTPATIENT)
Dept: FAMILY MEDICINE CLINIC | Age: 53
End: 2018-06-29

## 2018-06-29 DIAGNOSIS — E78.5 HYPERLIPIDEMIA, UNSPECIFIED HYPERLIPIDEMIA TYPE: Primary | ICD-10-CM

## 2018-06-29 RX ORDER — LOVASTATIN 40 MG/1
40 TABLET ORAL
Qty: 90 TAB | Refills: 1 | Status: SHIPPED | OUTPATIENT
Start: 2018-06-29 | End: 2018-09-05

## 2018-09-05 ENCOUNTER — OFFICE VISIT (OUTPATIENT)
Dept: FAMILY MEDICINE CLINIC | Age: 53
End: 2018-09-05

## 2018-09-05 VITALS
WEIGHT: 135.4 LBS | HEART RATE: 66 BPM | TEMPERATURE: 97.7 F | BODY MASS INDEX: 27.3 KG/M2 | SYSTOLIC BLOOD PRESSURE: 120 MMHG | OXYGEN SATURATION: 99 % | RESPIRATION RATE: 15 BRPM | HEIGHT: 59 IN | DIASTOLIC BLOOD PRESSURE: 85 MMHG

## 2018-09-05 DIAGNOSIS — Z86.73 HISTORY OF CVA (CEREBROVASCULAR ACCIDENT): ICD-10-CM

## 2018-09-05 DIAGNOSIS — E66.3 OVERWEIGHT (BMI 25.0-29.9): ICD-10-CM

## 2018-09-05 DIAGNOSIS — E78.5 HYPERLIPIDEMIA, UNSPECIFIED HYPERLIPIDEMIA TYPE: ICD-10-CM

## 2018-09-05 DIAGNOSIS — Z12.39 SCREENING FOR BREAST CANCER: ICD-10-CM

## 2018-09-05 DIAGNOSIS — E11.65 TYPE 2 DIABETES MELLITUS WITH HYPERGLYCEMIA, WITHOUT LONG-TERM CURRENT USE OF INSULIN (HCC): Primary | ICD-10-CM

## 2018-09-05 DIAGNOSIS — I10 ESSENTIAL HYPERTENSION WITH GOAL BLOOD PRESSURE LESS THAN 140/90: ICD-10-CM

## 2018-09-05 DIAGNOSIS — E04.2 MULTIPLE THYROID NODULES: ICD-10-CM

## 2018-09-05 RX ORDER — LOSARTAN POTASSIUM 50 MG/1
50 TABLET ORAL DAILY
Qty: 90 TAB | Refills: 1 | Status: SHIPPED | OUTPATIENT
Start: 2018-09-05 | End: 2019-02-21 | Stop reason: SDUPTHER

## 2018-09-05 RX ORDER — ATORVASTATIN CALCIUM 10 MG/1
10 TABLET, FILM COATED ORAL DAILY
Qty: 90 TAB | Refills: 1 | Status: SHIPPED | OUTPATIENT
Start: 2018-09-05 | End: 2019-02-21 | Stop reason: SDUPTHER

## 2018-09-05 RX ORDER — METFORMIN HYDROCHLORIDE 1000 MG/1
TABLET ORAL
Qty: 180 TAB | Refills: 1 | Status: SHIPPED | OUTPATIENT
Start: 2018-09-05 | End: 2019-02-21 | Stop reason: SDUPTHER

## 2018-09-05 NOTE — MR AVS SNAPSHOT
Ruddy Gilbert [place] [address] [facility] [address]. [address] [facility] 83 [address] 
[phone] Patient: Tony Tafoya MRN: [id number] :[id number] Visit Information Date & Time Provider Department Dept. Phone Encounter #  
 2018 11:30 AM Elieser Ho MD [facility] [id number] Follow-up Instructions Return in about 3 months (around 2018) for follow up. Upcoming Health Maintenance Date Due Pneumococcal 19-64 Medium Risk (1 of 1 - PPSV23) 1985 DTaP/Tdap/Td series (1 - Tdap) 1987 PAP AKA CERVICAL CYTOLOGY 6/15/2013 FOOT EXAM Q1 2016 BREAST CANCER SCRN MAMMOGRAM 2016 EYE EXAM RETINAL OR DILATED Q1 2017 HEMOGLOBIN A1C Q6M 2018 Influenza Age 5 to Adult 2018* MICROALBUMIN Q1 2018 LIPID PANEL Q1 2018 COLONOSCOPY 2028 *Topic was postponed. The date shown is not the original due date. Allergies as of 2018  Review Complete On: 2018 By: Chaparro Gonzales LPN Severity Noted Reaction Type Reactions Lisinopril Medium 2014   Intolerance Cough Current Immunizations  Reviewed on 2018 No immunizations on file. Reviewed by Elieser Ho MD on 2018 at 12:14 PM  
You Were Diagnosed With   
  
 Codes Comments Type 2 diabetes mellitus with hyperglycemia, without long-term current use of insulin (HCC)    -  Primary ICD-10-CM: E11.65 ICD-9-CM: 250.00, 790.29 Essential hypertension with goal blood pressure less than 140/90     ICD-10-CM: I10 
ICD-9-CM: 401.9 Hyperlipidemia, unspecified hyperlipidemia type     ICD-10-CM: E78.5 ICD-9-CM: 272.4 Multiple thyroid nodules     ICD-10-CM: E04.2 ICD-9-CM: 241.1 Screening for breast cancer     ICD-10-CM: Z12.31 
ICD-9-CM: V76.10 Overweight (BMI 25.0-29. 9)     ICD-10-CM: T59.8 ICD-9-CM: 278.02   
 History of CVA (cerebrovascular accident)     ICD-10-CM: Z86.73 
ICD-9-CM: V12.54 Vitals BP Pulse Temp Resp Height(growth percentile) Weight(growth percentile) 120/85 66 97.7 °F (36.5 °C) (Oral) 15 4' 11\" (1.499 m) 135 lb 6.4 oz (61.4 kg) LMP SpO2 BMI OB Status Smoking Status 03/15/2015 99% 27.35 kg/m2 Postmenopausal Never Smoker BMI and BSA Data Body Mass Index Body Surface Area  
 27.35 kg/m 2 1.6 m 2 Preferred Pharmacy Pharmacy Name Phone [address], [address] [facility] [phone] Your Updated Medication List  
  
   
This list is accurate as of 9/5/18 12:32 PM.  Always use your most recent med list.  
  
  
  
  
 aspirin 325 mg tablet Commonly known as:  ASPIRIN Take 325 mg by mouth daily. atorvastatin 10 mg tablet Commonly known as:  LIPITOR Take 1 Tab by mouth daily. ibuprofen 800 mg tablet Commonly known as:  MOTRIN  
as needed. losartan 50 mg tablet Commonly known as:  COZAAR Take 1 Tab by mouth daily. metFORMIN 1,000 mg tablet Commonly known as:  GLUCOPHAGE  
TAKE ONE TABLET BY MOUTH TWICE DAILY WITH MEALS  Indications: type 2 diabetes mellitus SITagliptin 100 mg tablet Commonly known as:  Wilda Guzman Take 1 Tab by mouth daily. Prescriptions Sent to Pharmacy Refills SITagliptin (JANUVIA) 100 mg tablet 1 Sig: Take 1 Tab by mouth daily. Class: Normal  
 Pharmacy: [address] , [address] Ph #: [phone] Route: Oral  
 metFORMIN (GLUCOPHAGE) 1,000 mg tablet 1 Sig: TAKE ONE TABLET BY MOUTH TWICE DAILY WITH MEALS  Indications: type 2 diabetes mellitus Class: Normal  
 Pharmacy: [address], [address] Ph #: [phone]  
 losartan (COZAAR) 50 mg tablet 1 Sig: Take 1 Tab by mouth daily.   
 Class: Normal  
 Pharmacy: [facility] [address], [address] Ph #: [phone] Route: Oral  
 atorvastatin (LIPITOR) 10 mg tablet 1 Sig: Take 1 Tab by mouth daily. Class: Normal  
 [facility] [address], [address] Ph #: [phone] Route: Oral  
  
Follow-up Instructions Return in about 3 months (around 12/5/2018) for follow up. To-Do List   
 11/04/2018 Imaging:  [facility] MAMMO BI SCREENING INCL CAD Patient Instructions High Cholesterol: Care Instructions Your Care Instructions Cholesterol is a type of fat in your blood. It is needed for many body functions, such as making new cells. Cholesterol is made by your body. It also comes from food you eat. High cholesterol means that you have too much of the fat in your blood. This raises your risk of a heart attack and stroke. LDL and HDL are part of your total cholesterol. LDL is the \"bad\" cholesterol. High LDL can raise your risk for heart disease, heart attack, and stroke. HDL is the \"good\" cholesterol. It helps clear bad cholesterol from the body. High HDL is linked with a lower risk of heart disease, heart attack, and stroke. Your cholesterol levels help your doctor find out your risk for having a heart attack or stroke. You and your doctor can talk about whether you need to lower your risk and what treatment is best for you. A heart-healthy lifestyle along with medicines can help lower your cholesterol and your risk. The way you choose to lower your risk will depend on how high your risk is for heart attack and stroke. It will also depend on how you feel about taking medicines. Follow-up care is a key part of your treatment and safety. Be sure to make and go to all appointments, and call your doctor if you are having problems. It's also a good idea to know your test results and keep a list of the medicines you take. How can you care for yourself at home? · Eat a variety of foods every day. Good choices include fruits, vegetables, whole grains (like oatmeal), dried beans and peas, nuts and seeds, soy products (like tofu), and fat-free or low-fat dairy products. · Replace butter, margarine, and hydrogenated or partially hydrogenated oils with olive and canola oils. (Canola oil margarine without trans fat is fine.) · Replace red meat with fish, poultry, and soy protein (like tofu). · Limit processed and packaged foods like chips, crackers, and cookies. · Bake, broil, or steam foods. Don't triana them. · Be physically active. Get at least 30 minutes of exercise on most days of the week. Walking is a good choice. You also may want to do other activities, such as running, swimming, cycling, or playing tennis or team sports. · Stay at a healthy weight or lose weight by making the changes in eating and physical activity listed above. Losing just a small amount of weight, even 5 to 10 pounds, can reduce your risk for having a heart attack or stroke. · Do not smoke. When should you call for help? Watch closely for changes in your health, and be sure to contact your doctor if: 
  · You need help making lifestyle changes.  
  · You have questions about your medicine. Where can you learn more? Go to http://georgette-leslie.info/. Enter D973 in the search box to learn more about \"High Cholesterol: Care Instructions. \" Current as of: May 10, 2017 Content Version: 11.7 © 1669-6133 "[employer] Incorporated. Care instructions adapted under license by [employer]gomed (which disclaims liability or warranty for this information). If you have questions about a medical condition or this instruction, always ask your healthcare professional. [address] any warranty or liability for your use of this information. Introducing [facility]! Dear Vimal Ross: 
Thank you for requesting a MyNextRun account.   Our records indicate that you already have an active Tiggly account. You can access your account anytime at [email or web address] [email or web address] Did you know that you can access your hospital and ER discharge instructions at any time in Tiggly? You can also review all of your test results from your hospital stay or ER visit. Additional Information If you have questions, please visit the Frequently Asked Questions section of the Tiggly website at [email or web address] [email or web address] Remember, Tiggly is NOT to be used for urgent needs. For medical emergencies, dial 911. Now available from your iPhone and Android! Please provide this summary of care documentation to your next provider. Your primary care clinician is listed as Conrad Gomez. If you have any questions after today's visit, please call [phone].

## 2018-09-05 NOTE — PROGRESS NOTES
Chief Complaint Patient presents with  Follow Up Chronic Condition 5 month; patient is fasting 1. Have you been to the ER, urgent care clinic since your last visit? Hospitalized since your last visit? No 
 
2. Have you seen or consulted any other health care providers outside of the [facility] since your last visit? Include any pap smears or colon screening.  No

## 2018-09-05 NOTE — PROGRESS NOTES
Chief Complaint Patient presents with  Follow Up Chronic Condition 5 month;patient due for A1C,  patient is fasting Pt is a 47y.o. year old female who presents for follow up of her chronic medical problems Health Maintenance Due Topic Date Due  Pneumococcal 19-64 Medium Risk (1 of 1 - PPSV23) 11/13/1985-refused  DTaP/Tdap/Td series (1 - Tdap) 11/13/1987-refused  PAP AKA CERVICAL CYTOLOGY  06/15/2013-Dr. Quentin Alberto at [facility]  FOOT EXAM Q1  06/12/2016-today  BREAST CANCER SCRN MAMMOGRAM  11/13/2016-needs it done/did not get a call last time  EYE EXAM RETINAL OR DILATED Q1  06/23/2017-wears eyeglasses, just had it done in April Dr. Tiarra Brasher  HEMOGLOBIN A1C Q6M  05/30/2018-today  Influenza Age 5 to Adult  08/01/2018-will do it at work Lovastatin not covered, stopped it 2 months ago Used to be on Lipitor-changed to Lovastatin bec of formulary Fasting today Lab Results Component Value Date/Time Cholesterol, total 196 09/05/2018 12:04 PM  
 HDL Cholesterol 44 09/05/2018 12:04 PM  
 LDL, calculated 124 (H) 09/05/2018 12:04 PM  
 VLDL, calculated 28 09/05/2018 12:04 PM  
 Triglyceride 139 09/05/2018 12:04 PM  
 
 
BP Readings from Last 3 Encounters:  
09/05/18 120/85  
04/25/18 119/82  
02/12/18 124/81 Had aneurysm repair by Dr. Nely Dunn 11/25/2013 -embolization Follows there Q 2 yrs Had acute CVA in 2016-ASA dose increased Wt Readings from Last 3 Encounters:  
09/05/18 135 lb 6.4 oz (61.4 kg) 04/25/18 136 lb (61.7 kg) 02/12/18 135 lb (61.2 kg) BMI 27 ROS: 
 
Pt denies: Wt loss, Fever/Chills, HA, Visual changes, Fatigue, Chest pain, SOB, JASON, Abd pain, N/V/D/C, Blood in stool or urine, Edema. Pertinent positive as above in HPI. All others were negative Patient Active Problem List  
Diagnosis Code  Aneurysm of middle cerebral artery I67.1  Hyperglycemia due to type 2 diabetes mellitus ([facility] [address] E11.65  
  Essential hypertension with goal blood pressure less than 140/90 I10  Gastroesophageal reflux disease K21.9  Multiple thyroid nodules E04.2  Hyperlipidemia E78.5  Overweight (BMI 25.0-29. 9) E66.3  History of CVA (cerebrovascular accident) D04.95 Past Medical History:  
Diagnosis Date  Environmental allergies  Headache(784.0) hx migraines  HTN (hypertension) 9/23/2013  Hyperlipidemia 6/29/2018  Lacunar infarct, acute ([address] 2/17/16  
 admittted at [facility] 2/17-2/19  Stroke [facility])   
 tia 9/10/13  Type II or unspecified type diabetes mellitus without mention of complication, not stated as uncontrolled 10/10/2013 Current Outpatient Prescriptions Medication Sig Dispense Refill  SITagliptin (JANUVIA) 100 mg tablet Take 1 Tab by mouth daily. 90 Tab 1  
 metFORMIN (GLUCOPHAGE) 1,000 mg tablet TAKE ONE TABLET BY MOUTH TWICE DAILY WITH MEALS  Indications: type 2 diabetes mellitus 180 Tab 1  
 losartan (COZAAR) 50 mg tablet Take 1 Tab by mouth daily. 90 Tab 1  ibuprofen (MOTRIN) 800 mg tablet as needed.  aspirin (ASPIRIN) 325 mg tablet Take 325 mg by mouth daily.    90 Tab 1     12 Tab 0  
 History Smoking Status  Never Smoker Smokeless Tobacco  
 Never Used Allergies Allergen Reactions  Lisinopril Cough Patient Labs were reviewed: yes Patient Past Records were reviewed:  yes Objective:  
 
Vitals:  
 09/05/18 1149 BP: 120/85 Pulse: 66 Resp: 15 Temp: 97.7 °F (36.5 °C) TempSrc: Oral  
SpO2: 99% Weight: 135 lb 6.4 oz (61.4 kg) Height: 4' 11\" (1.499 m) Body mass index is 27.35 kg/(m^2). Exam:  
Appearance: alert, well appearing,  oriented to person, place, and time, acyanotic, in no respiratory distress and well hydrated. HEENT:  NC/AT, pink conj, anicteric sclerae Neck:  No cervical lymphadenopathy, no JVD, no thyromegaly, no carotid bruit Heart:  RRR without M/R/G 
 Lungs:  CTAB, no rhonchi, rales, or wheezes with good air exchange Abdomen:  Non-tender, pos bowel sounds, no hepatosplenomegaly Ext:  No C/C/E Skin: no rash Neuro: no lateralizing signs, CNs II-XII intact Diabetic foot exam:  
 
Left Foot: 
 Visual Exam: normal  
 Pulse DP: 2+ (normal) Filament test: normal sensation Vibratory sensation: normal 
   
Right Foot: 
 Visual Exam: normal  
 Pulse DP: 2+ (normal) Filament test: normal sensation Vibratory sensation: normal 
 
 
Assessment/ Plan:  
Diagnoses and all orders for this visit: 
 
1. Type 2 diabetes mellitus with hyperglycemia, without long-term current use of insulin (HCC)-will adjust meds based on A1c results 
-     SITagliptin (JANUVIA) 100 mg tablet; Take 1 Tab by mouth daily. -     metFORMIN (GLUCOPHAGE) 1,000 mg tablet; TAKE ONE TABLET BY MOUTH TWICE DAILY WITH MEALS  Indications: type 2 diabetes mellitus 
-     HEMOGLOBIN A1C W/O EAG; Future 2. Essential hypertension with goal blood pressure less than 140/90-controlled, continue with present meds 
-     losartan (COZAAR) 50 mg tablet; Take 1 Tab by mouth daily. 
-     CBC WITH AUTOMATED DIFF; Future -     METABOLIC PANEL, COMPREHENSIVE; Future 3. Hyperlipidemia, unspecified hyperlipidemia type-will change back to Lipitor bec of formulary; goal LDL less [id number] bec of CVa hx 
-     LIPID PANEL; Future 
-     atorvastatin (LIPITOR) 10 mg tablet; Take 1 Tab by mouth daily. 4. Multiple thyroid nodules-asymptomatic 
-     TSH 3RD GENERATION; Future 5. Screening for breast cancer -     [facility] MAMMO BI SCREENING INCL CAD; Future 6. Overweight (BMI 25.0-29. 9)-discussed limiting eric to 7284-7553/day and exercising at least 150 min a week 7. History of CVA (cerebrovascular accident)-continue with daily ASA Follow-up Disposition: 
Return in about 3 months (around 12/5/2018) for follow up.  
Patient given number of Rosangela Hernandes to schedule her mammogram 
 
 
 I have discussed the diagnosis with the patient and the intended plan as seen in the above orders. The patient has received an After-Visit Summary and questions were answered concerning future plans. Medication Side Effects and Warnings were discussed with patient: yes Patient verbalized understanding of above instructions. Sourav Albrecht MD 
Internal Medicine [address] [place] [address]

## 2018-09-05 NOTE — PATIENT INSTRUCTIONS

## 2018-09-06 LAB
ALBUMIN SERPL-MCNC: 4.5 G/DL (ref 3.5–5.5)
ALBUMIN/GLOB SERPL: 1.5 {RATIO} (ref 1.2–2.2)
ALP SERPL-CCNC: 60 IU/L (ref 39–117)
ALT SERPL-CCNC: 34 IU/L (ref 0–32)
AST SERPL-CCNC: 27 IU/L (ref 0–40)
BASOPHILS # BLD AUTO: 0 X10E3/UL (ref 0–0.2)
BASOPHILS NFR BLD AUTO: 0 %
BILIRUB SERPL-MCNC: 0.4 MG/DL (ref 0–1.2)
BUN SERPL-MCNC: 13 MG/DL (ref 6–24)
BUN/CREAT SERPL: 19 (ref 9–23)
CALCIUM SERPL-MCNC: 9.4 MG/DL (ref 8.7–10.2)
CHLORIDE SERPL-SCNC: 102 MMOL/L (ref 96–106)
CHOLEST SERPL-MCNC: 196 MG/DL (ref 100–199)
CO2 SERPL-SCNC: 24 MMOL/L (ref 20–29)
CREAT SERPL-MCNC: 0.69 MG/DL (ref 0.57–1)
EOSINOPHIL # BLD AUTO: 0.1 X10E3/UL (ref 0–0.4)
EOSINOPHIL NFR BLD AUTO: 2 %
ERYTHROCYTE [DISTWIDTH] IN BLOOD BY AUTOMATED COUNT: 13.3 % (ref 12.3–15.4)
GLOBULIN SER CALC-MCNC: 3.1 G/DL (ref 1.5–4.5)
GLUCOSE SERPL-MCNC: 116 MG/DL (ref 65–99)
HBA1C MFR BLD: 6.6 % (ref 4.8–5.6)
HCT VFR BLD AUTO: 40.9 % (ref 34–46.6)
HDLC SERPL-MCNC: 44 MG/DL
HGB BLD-MCNC: 13.9 G/DL (ref 11.1–15.9)
IMM GRANULOCYTES # BLD: 0 X10E3/UL (ref 0–0.1)
IMM GRANULOCYTES NFR BLD: 0 %
INTERPRETATION, 910389: NORMAL
LDLC SERPL CALC-MCNC: 124 MG/DL (ref 0–99)
LYMPHOCYTES # BLD AUTO: 2.7 X10E3/UL (ref 0.7–3.1)
LYMPHOCYTES NFR BLD AUTO: 36 %
Lab: NORMAL
MCH RBC QN AUTO: 30.1 PG (ref 26.6–33)
MCHC RBC AUTO-ENTMCNC: 34 G/DL (ref 31.5–35.7)
MCV RBC AUTO: 89 FL (ref 79–97)
MONOCYTES # BLD AUTO: 0.4 X10E3/UL (ref 0.1–0.9)
MONOCYTES NFR BLD AUTO: 6 %
NEUTROPHILS # BLD AUTO: 4.2 X10E3/UL (ref 1.4–7)
NEUTROPHILS NFR BLD AUTO: 56 %
PLATELET # BLD AUTO: 231 X10E3/UL (ref 150–379)
POTASSIUM SERPL-SCNC: 3.7 MMOL/L (ref 3.5–5.2)
PROT SERPL-MCNC: 7.6 G/DL (ref 6–8.5)
RBC # BLD AUTO: 4.62 X10E6/UL (ref 3.77–5.28)
SODIUM SERPL-SCNC: 140 MMOL/L (ref 134–144)
TRIGL SERPL-MCNC: 139 MG/DL (ref 0–149)
TSH SERPL DL<=0.005 MIU/L-ACNC: 0.97 UIU/ML (ref 0.45–4.5)
VLDLC SERPL CALC-MCNC: 28 MG/DL (ref 5–40)
WBC # BLD AUTO: 7.5 X10E3/UL (ref 3.4–10.8)

## 2018-09-21 ENCOUNTER — HOSPITAL ENCOUNTER (OUTPATIENT)
Dept: MAMMOGRAPHY | Age: 53
Discharge: HOME | End: 2018-09-21
Attending: INTERNAL MEDICINE
Payer: COMMERCIAL

## 2018-09-21 DIAGNOSIS — Z12.39 SCREENING FOR BREAST CANCER: ICD-10-CM

## 2018-09-21 PROCEDURE — 77067 SCR MAMMO BI INCL CAD: CPT

## 2019-02-21 ENCOUNTER — OFFICE VISIT (OUTPATIENT)
Dept: FAMILY MEDICINE CLINIC | Age: 54
End: 2019-02-21

## 2019-02-21 VITALS
WEIGHT: 136 LBS | RESPIRATION RATE: 14 BRPM | HEART RATE: 62 BPM | BODY MASS INDEX: 27.42 KG/M2 | SYSTOLIC BLOOD PRESSURE: 120 MMHG | HEIGHT: 59 IN | DIASTOLIC BLOOD PRESSURE: 80 MMHG | OXYGEN SATURATION: 100 % | TEMPERATURE: 96.3 F

## 2019-02-21 DIAGNOSIS — I10 ESSENTIAL HYPERTENSION WITH GOAL BLOOD PRESSURE LESS THAN 140/90: ICD-10-CM

## 2019-02-21 DIAGNOSIS — E11.65 TYPE 2 DIABETES MELLITUS WITH HYPERGLYCEMIA, WITHOUT LONG-TERM CURRENT USE OF INSULIN (HCC): Primary | ICD-10-CM

## 2019-02-21 DIAGNOSIS — E78.5 HYPERLIPIDEMIA, UNSPECIFIED HYPERLIPIDEMIA TYPE: ICD-10-CM

## 2019-02-21 LAB — HBA1C MFR BLD HPLC: 6.6 %

## 2019-02-21 RX ORDER — LOSARTAN POTASSIUM 50 MG/1
50 TABLET ORAL DAILY
Qty: 90 TAB | Refills: 1 | Status: SHIPPED | OUTPATIENT
Start: 2019-02-21 | End: 2019-05-21 | Stop reason: SDUPTHER

## 2019-02-21 RX ORDER — METFORMIN HYDROCHLORIDE 1000 MG/1
TABLET ORAL
Qty: 180 TAB | Refills: 1 | Status: SHIPPED | OUTPATIENT
Start: 2019-02-21 | End: 2019-05-21 | Stop reason: SDUPTHER

## 2019-02-21 RX ORDER — ATORVASTATIN CALCIUM 10 MG/1
10 TABLET, FILM COATED ORAL DAILY
Qty: 90 TAB | Refills: 1 | Status: SHIPPED | OUTPATIENT
Start: 2019-02-21 | End: 2019-05-21 | Stop reason: SDUPTHER

## 2019-02-21 NOTE — PROGRESS NOTES
Pato Castillo is a 47 y.o. female and presents with Follow-up (annual check, med refill , A1C Check) Subjective: 
 
DMII- 
 Patient reports medication compliance 7 times a week Diabetic diet compliance occasionally Patient monitors blood sugars regularly never Reports am fasting sugars range does not do. Denies hypoglycemic episodes. yes sometimes feels weak and then takes chocolate and feels normal 
 
 
Hypertension: 
 Patient reports taking medications as instructed. yes No medication side effects noted. no Headache upon wakening. no 
 Home BP monitoring in range of 107'M'C systolic. Creatinine, urine Date Value Ref Range Status 11/30/2017 78.6 Not Estab. mg/dL Final  
 
Microalb/Creat ratio (ug/mg creat.) Date Value Ref Range Status 11/30/2017 <3.8 0.0 - 30.0 mg/g creat Final  
10/10/2013 17.7 0.0 - 30.0 Final  
  Comment:  
  THE UNIT OF MEASURE FOR MICROALB/CREAT RATIO IS MCG/MG OF CREATININE. HLD: 
Has been compliant with meds yes Compliant with low-fat diet. yes Denies myalgias or other side effects. no 
 
ROS: 
As stated in HPI, otherwise negative. ROS The problem list was updated as a part of today's visit. Patient Active Problem List  
Diagnosis Code  Aneurysm of middle cerebral artery I67.1  Hyperglycemia due to type 2 diabetes mellitus ([facility] [address] E11.65  
 Essential hypertension with goal blood pressure less than 140/90 I10  Gastroesophageal reflux disease K21.9  Multiple thyroid nodules E04.2  Hyperlipidemia E78.5  Overweight (BMI 25.0-29. 9) E66.3  History of CVA (cerebrovascular accident) T75.28 The PSH, FH were reviewed. SH: Social History Tobacco Use  Smoking status: Never Smoker  Smokeless tobacco: Never Used Substance Use Topics  Alcohol use: No  
 Drug use: No  
 
 
Medications/Allergies: 
Current Outpatient Medications on File Prior to Visit Medication Sig Dispense Refill  ibuprofen (MOTRIN) 800 mg tablet as needed.  aspirin (ASPIRIN) 325 mg tablet Take 325 mg by mouth daily. No current facility-administered medications on file prior to visit. Allergies Allergen Reactions  Lisinopril Cough Objective: 
Visit Vitals /80 (BP 1 Location: Right arm, BP Patient Position: Sitting) Pulse 62 Temp 96.3 °F (35.7 °C) (Oral) Resp 14 Ht 4' 11\" (1.499 m) Wt 136 lb (61.7 kg) LMP 03/15/2015 SpO2 100% BMI 27.47 kg/m² Body mass index is 27.47 kg/m². Physical assessment Physical Exam  
Constitutional: She is oriented to person, place, and time and well-developed, well-nourished, and in no distress. Neck: Normal range of motion. No JVD present. Cardiovascular: Normal rate, regular rhythm, normal heart sounds and intact distal pulses. Exam reveals no gallop and no friction rub. No murmur heard. Pulmonary/Chest: Effort normal and breath sounds normal.  
Neurological: She is alert and oriented to person, place, and time. Skin: Skin is warm and dry. Psychiatric: Memory, affect and judgment normal.  
Nursing note and vitals reviewed. Labwork and Ancillary Studies: CBC w/Diff Lab Results Component Value Date/Time WBC 7.5 09/05/2018 12:04 PM  
 HGB 13.9 09/05/2018 12:04 PM  
 PLATELET 913 43/34/1475 12:04 PM  
  
 
 Basic Metabolic Profile Lab Results Component Value Date/Time Sodium 140 09/05/2018 12:04 PM  
 Potassium 3.7 09/05/2018 12:04 PM  
 Chloride 102 09/05/2018 12:04 PM  
 CO2 24 09/05/2018 12:04 PM  
 Anion gap 10.0 04/02/2014 03:00 PM  
 Glucose 116 (H) 09/05/2018 12:04 PM  
 BUN 13 09/05/2018 12:04 PM  
 Creatinine 0.69 09/05/2018 12:04 PM  
 BUN/Creatinine ratio 19 09/05/2018 12:04 PM  
 GFR est  09/05/2018 12:04 PM  
 GFR est non- 09/05/2018 12:04 PM  
 Calcium 9.4 09/05/2018 12:04 PM  
  
 
Cholesterol Lab Results Component Value Date/Time  Cholesterol, total 196 09/05/2018 12:04 PM  
 HDL Cholesterol 44 09/05/2018 12:04 PM  
 LDL, calculated 124 (H) 09/05/2018 12:04 PM  
 Triglyceride 139 09/05/2018 12:04 PM  
 
 
Assessment/Plan:   
Diagnoses and all orders for this visit: 
 
1. Type 2 diabetes mellitus with hyperglycemia, without long-term current use of insulin (HCC) 
-     HEMOGLOBIN A1C WITH EAG; Future 
-     metFORMIN (GLUCOPHAGE) 1,000 mg tablet; TAKE ONE TABLET BY MOUTH TWICE DAILY WITH MEALS 
-     SITagliptin (JANUVIA) 100 mg tablet; Take 1 Tab by mouth daily. 2. Hyperlipidemia, unspecified hyperlipidemia type 
-     atorvastatin (LIPITOR) 10 mg tablet; Take 1 Tab by mouth daily. 3. Essential hypertension with goal blood pressure less than 140/90 
-     MICROALBUMIN, UR, RAND W/ MICROALB/CREAT RATIO; Future 
-     losartan (COZAAR) 50 mg tablet; Take 1 Tab by mouth daily. Mrs. Dayami Bowman has been compliant with her medication regimen. She admits to not following the proper diet closely. Blood pressure today 120/80, controlled, continue same regimen. POC A1C 6.6, last check was 6.6 9/18, continue same medications Health Maintenance:  
Health Maintenance Topic Date Due  
 PAP AKA CERVICAL CYTOLOGY  06/15/2013  Shingrix Vaccine Age 50> (1 of 2) 11/13/2016  
 EYE EXAM RETINAL OR DILATED  06/23/2018  Influenza Age 5 to Adult  08/01/2018  MICROALBUMIN Q1  11/30/2018  HEMOGLOBIN A1C Q6M  03/05/2019  Pneumococcal 19-64 Medium Risk (1 of 1 - PPSV23) 09/12/2019 (Originally 11/13/1985)  DTaP/Tdap/Td series (1 - Tdap) 09/12/2019 (Originally 11/13/1987)  FOOT EXAM Q1  09/05/2019  LIPID PANEL Q1  09/05/2019  BREAST CANCER SCRN MAMMOGRAM  09/21/2020  COLONOSCOPY  05/30/2028 I have discussed the diagnosis with the patient and the intended plan as seen in the above orders. The patient has received an After-Visit Summary and questions were answered concerning future plans. An After Visit Summary was printed and given to the patient. All diagnosis have been discussed with the patient and all of the patient's questions have been answered. Follow-up Disposition: 
Return in about 3 months (around 5/21/2019) for Annual physical/ with Digna Whitehead. STEPHEN Otto [address] [facility] [employer] [address] [place] [address]. [phone]

## 2019-02-21 NOTE — PATIENT INSTRUCTIONS
DASH Diet: Care Instructions Your Care Instructions The DASH diet is an eating plan that can help lower your blood pressure. DASH stands for Dietary Approaches to Stop Hypertension. Hypertension is high blood pressure. The DASH diet focuses on eating foods that are high in calcium, potassium, and magnesium. These nutrients can lower blood pressure. The foods that are highest in these nutrients are fruits, vegetables, low-fat dairy products, nuts, seeds, and legumes. But taking calcium, potassium, and magnesium supplements instead of eating foods that are high in those nutrients does not have the same effect. The DASH diet also includes whole grains, fish, and poultry. The DASH diet is one of several lifestyle changes your doctor may recommend to lower your high blood pressure. Your doctor may also want you to decrease the amount of sodium in your diet. Lowering sodium while following the DASH diet can lower blood pressure even further than just the DASH diet alone. Follow-up care is a key part of your treatment and safety. Be sure to make and go to all appointments, and call your doctor if you are having problems. It's also a good idea to know your test results and keep a list of the medicines you take. How can you care for yourself at home? Following the DASH diet · Eat 4 to 5 servings of fruit each day. A serving is 1 medium-sized piece of fruit, ½ cup chopped or canned fruit, 1/4 cup dried fruit, or 4 ounces (½ cup) of fruit juice. Choose fruit more often than fruit juice. · Eat 4 to 5 servings of vegetables each day. A serving is 1 cup of lettuce or raw leafy vegetables, ½ cup of chopped or cooked vegetables, or 4 ounces (½ cup) of vegetable juice. Choose vegetables more often than vegetable juice. · Get 2 to 3 servings of low-fat and fat-free dairy each day. A serving is 8 ounces of milk, 1 cup of yogurt, or 1 ½ ounces of cheese. · Eat 6 to 8 servings of grains each day. A serving is 1 slice of bread, 1 ounce of dry cereal, or ½ cup of cooked rice, pasta, or cooked cereal. Try to choose whole-grain products as much as possible. · Limit lean meat, poultry, and fish to 2 servings each day. A serving is 3 ounces, about the size of a deck of cards. · Eat 4 to 5 servings of nuts, seeds, and legumes (cooked dried beans, lentils, and split peas) each week. A serving is 1/3 cup of nuts, 2 tablespoons of seeds, or ½ cup of cooked beans or peas. · Limit fats and oils to 2 to 3 servings each day. A serving is 1 teaspoon of vegetable oil or 2 tablespoons of salad dressing. · Limit sweets and added sugars to 5 servings or less a week. A serving is 1 tablespoon jelly or jam, ½ cup sorbet, or 1 cup of lemonade. · Eat less than 2,300 milligrams (mg) of sodium a day. If you limit your sodium to 1,500 mg a day, you can lower your blood pressure even more. Tips for success · Start small. Do not try to make dramatic changes to your diet all at once. You might feel that you are missing out on your favorite foods and then be more likely to not follow the plan. Make small changes, and stick with them. Once those changes become habit, add a few more changes. · Try some of the following: ? Make it a goal to eat a fruit or vegetable at every meal and at snacks. This will make it easy to get the recommended amount of fruits and vegetables each day. ? Try yogurt topped with fruit and nuts for a snack or healthy dessert. ? Add lettuce, tomato, cucumber, and onion to sandwiches. ? Combine a ready-made pizza crust with low-fat mozzarella cheese and lots of vegetable toppings. Try using tomatoes, squash, spinach, broccoli, carrots, cauliflower, and onions. ? Have a variety of cut-up vegetables with a low-fat dip as an appetizer instead of chips and dip. ? Sprinkle sunflower seeds or chopped almonds over salads.  Or try adding chopped walnuts or almonds to cooked vegetables. ? Try some vegetarian meals using beans and peas. Add garbanzo or kidney beans to salads. Make burritos and tacos with mashed cardoso beans or black beans. Where can you learn more? Go to http://georgette-leslie.info/. Enter S101 in the search box to learn more about \"DASH Diet: Care Instructions. \" Current as of: July 22, 2018 Content Version: 11.9 © 2107-0051 [employer]. Care instructions adapted under license by [employer] (which disclaims liability or warranty for this information). If you have questions about a medical condition or this instruction, always ask your healthcare professional. [address] any warranty or liability for your use of this information.

## 2019-02-21 NOTE — PROGRESS NOTES
Chief Complaint Patient presents with  Follow-up  
  annual check, med refill , A1C Check 1. Have you been to the ER, urgent care clinic since your last visit? Hospitalized since your last visit? No 
 
2. Have you seen or consulted any other health care providers outside of the [address] [place] [address] since your last visit? Include any pap smears or colon screening. No 
Visit Vitals /80 (BP 1 Location: Right arm, BP Patient Position: Sitting) Pulse 62 Temp 96.3 °F (35.7 °C) (Oral) Resp 14 Ht 4' 11\" (1.499 m) Wt 136 lb (61.7 kg) SpO2 100% BMI 27.47 kg/m²

## 2019-02-22 LAB
ALBUMIN/CREAT UR: <5.1 MG/G CREAT (ref 0–30)
CREAT UR-MCNC: 58.5 MG/DL
MICROALBUMIN UR-MCNC: <3 UG/ML

## 2019-05-21 ENCOUNTER — OFFICE VISIT (OUTPATIENT)
Dept: FAMILY MEDICINE CLINIC | Age: 54
End: 2019-05-21

## 2019-05-21 VITALS
RESPIRATION RATE: 15 BRPM | BODY MASS INDEX: 28.1 KG/M2 | WEIGHT: 139.4 LBS | DIASTOLIC BLOOD PRESSURE: 85 MMHG | OXYGEN SATURATION: 99 % | TEMPERATURE: 97.1 F | SYSTOLIC BLOOD PRESSURE: 116 MMHG | HEIGHT: 59 IN | HEART RATE: 63 BPM

## 2019-05-21 DIAGNOSIS — K21.9 GASTROESOPHAGEAL REFLUX DISEASE, ESOPHAGITIS PRESENCE NOT SPECIFIED: ICD-10-CM

## 2019-05-21 DIAGNOSIS — Z00.00 WELL WOMAN EXAM (NO GYNECOLOGICAL EXAM): Primary | ICD-10-CM

## 2019-05-21 DIAGNOSIS — E04.2 MULTIPLE THYROID NODULES: ICD-10-CM

## 2019-05-21 DIAGNOSIS — Z86.73 HISTORY OF CVA (CEREBROVASCULAR ACCIDENT): ICD-10-CM

## 2019-05-21 DIAGNOSIS — I10 ESSENTIAL HYPERTENSION WITH GOAL BLOOD PRESSURE LESS THAN 140/90: ICD-10-CM

## 2019-05-21 DIAGNOSIS — E66.3 OVERWEIGHT (BMI 25.0-29.9): ICD-10-CM

## 2019-05-21 DIAGNOSIS — E78.5 HYPERLIPIDEMIA, UNSPECIFIED HYPERLIPIDEMIA TYPE: ICD-10-CM

## 2019-05-21 DIAGNOSIS — E11.65 TYPE 2 DIABETES MELLITUS WITH HYPERGLYCEMIA, WITHOUT LONG-TERM CURRENT USE OF INSULIN (HCC): ICD-10-CM

## 2019-05-21 RX ORDER — LOSARTAN POTASSIUM 50 MG/1
50 TABLET ORAL DAILY
Qty: 90 TAB | Refills: 1 | Status: SHIPPED | OUTPATIENT
Start: 2019-05-21 | End: 2020-02-13 | Stop reason: SDUPTHER

## 2019-05-21 RX ORDER — ATORVASTATIN CALCIUM 10 MG/1
10 TABLET, FILM COATED ORAL DAILY
Qty: 90 TAB | Refills: 1 | Status: SHIPPED | OUTPATIENT
Start: 2019-05-21 | End: 2020-02-13 | Stop reason: SDUPTHER

## 2019-05-21 RX ORDER — ASPIRIN 325 MG
325 TABLET ORAL DAILY
Qty: 90 TAB | Refills: 1 | Status: SHIPPED | OUTPATIENT
Start: 2019-05-21

## 2019-05-21 RX ORDER — OMEPRAZOLE 40 MG/1
40 CAPSULE, DELAYED RELEASE ORAL DAILY
Qty: 30 CAP | Refills: 1 | Status: SHIPPED | OUTPATIENT
Start: 2019-05-21 | End: 2020-02-13

## 2019-05-21 RX ORDER — METFORMIN HYDROCHLORIDE 1000 MG/1
TABLET ORAL
Qty: 180 TAB | Refills: 1 | Status: SHIPPED | OUTPATIENT
Start: 2019-05-21 | End: 2020-02-13 | Stop reason: SDUPTHER

## 2019-05-21 NOTE — PROGRESS NOTES
Chief Complaint   Patient presents with    Complete Physical     No PAP     1. Have you been to the ER, urgent care clinic since your last visit? Hospitalized since your last visit? No    2. Have you seen or consulted any other health care providers outside of the [address] [place] [address] since your last visit? Include any pap smears or colon screening.  No

## 2019-05-21 NOTE — PROGRESS NOTES
Chief Complaint   Patient presents with    Complete Physical     No PAP       Pt is a 47y.o. year old female who presents for follow up of her chronic medical problems    Health Maintenance Due   Topic Date Due    Pneumococcal 0-64 years (1 of 1 - PPSV23) 11[birth year]/1972    PAP AKA CERVICAL CYTOLOGY -done by Dr Susana Polanco obtain copy; normal per patient 06/15/2013    Shingrix Vaccine Age 50> (1 of 2) 11/13/2016    EYE EXAM RETINAL OR DILATED -wears eyeglasses; Dr Shereen Bertrand will make an appt with them 06/23/2018     Mammo done 9/2018-normal  Colonoscopy up to date  Declined vaccines    Wt Readings from Last 3 Encounters:   05/21/19 139 lb 6.4 oz (63.2 kg)   02/21/19 136 lb (61.7 kg)   09/05/18 135 lb 6.4 oz (61.4 kg)   BMI 28    BP Readings from Last 3 Encounters:   05/21/19 116/85   02/21/19 120/80   09/05/18 120/85       Lab Results   Component Value Date/Time    Hemoglobin A1c 6.6 (H) 09/05/2018 12:04 PM    Hemoglobin A1c (POC) 6.6 02/21/2019 12:56 PM       Lab Results   Component Value Date/Time    Cholesterol, total 196 09/05/2018 12:04 PM    HDL Cholesterol 44 09/05/2018 12:04 PM    LDL, calculated 124 (H) 09/05/2018 12:04 PM    VLDL, calculated 28 09/05/2018 12:04 PM    Triglyceride 139 09/05/2018 12:04 PM   Fasting? Just had banana    Neuro follow up-no longer following; told before every 2 yrs  Had CVA 2016; aneurysm repair 2013  MR head done 2016  On high dose of ASA since second stroke    On diffusion images, there is no evidence of acute infarction, acute ischemic process or restricted diffusion in brain. 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 Redemonstration of old focal infarction in the right corona radiata. Mild-to-moderate chronic microvascular ischemic changes in white lateral both cerebral hemispheres, redemonstrated.     Sxs of GERD, nausea and bloating after eating-wants to consult with GI  Tried Zantac which helps  Hx of cholecystectomy    Needs refill of meds    Incidental thyroid nodule on MRA neck-largest on the left  Patient says she has hx of parathyroid adenoma? while still in the [place]  Occasional difficulty with swallowing she says    ROS:    Pt denies: Wt loss, Fever/Chills, HA, Visual changes, Fatigue, Chest pain, SOB, AJSON, Abd pain, N/V/D/C, Blood in stool or urine, Edema. Pertinent positive as above in HPI. All others were negative    Patient Active Problem List   Diagnosis Code    Aneurysm of middle cerebral artery I67.1    Hyperglycemia due to type 2 diabetes mellitus ([facility] [address] E11.65    Essential hypertension with goal blood pressure less than 140/90 I10    Gastroesophageal reflux disease K21.9    Multiple thyroid nodules E04.2    Hyperlipidemia E78.5    Overweight (BMI 25.0-29. 9) E66.3    History of CVA (cerebrovascular accident) Z86.73       Past Medical History:   Diagnosis Date    Environmental allergies     Headache(784.0)     hx migraines    HTN (hypertension) 9/23/2013    Hyperlipidemia 6/29/2018    Lacunar infarct, acute ([facility] [address] 2/17/16    [facility]mittted [facility]17-[facility]/19    Menopause     Stroke [facility])     tia 9/10/13    Type II or unspecified type diabetes mellitus without mention of complication, not stated as uncontrolled 10/10/2013       Current Outpatient Medications   Medication Sig Dispense Refill    omeprazole (PRILOSEC) 40 mg capsule Take 1 Cap by mouth daily. 30 Cap 1    aspirin (ASPIRIN) 325 mg tablet Take 1 Tab by mouth daily. 90 Tab 1    atorvastatin (LIPITOR) 10 mg tablet Take 1 Tab by mouth daily. 90 Tab 1    losartan (COZAAR) 50 mg tablet Take 1 Tab by mouth daily. 90 Tab 1    metFORMIN (GLUCOPHAGE) 1,000 mg tablet TAKE ONE TABLET BY MOUTH TWICE DAILY WITH MEALS  Indications: type 2 diabetes mellitus 180 Tab 1    SITagliptin (JANUVIA) 100 mg tablet Take 1 Tab by mouth daily. 90 Tab 1    ibuprofen (MOTRIN) 800 mg tablet as needed.          Social History Tobacco Use   Smoking Status Never Smoker   Smokeless Tobacco Never Used       Allergies   Allergen Reactions    Lisinopril Cough       Patient Labs were reviewed: yes      Patient Past Records were reviewed:  yes        Objective:     Vitals:    05/21/19 1307   BP: 116/85   Pulse: 63   Resp: 15   Temp: 97.1 °F (36.2 °C)   TempSrc: Oral   SpO2: 99%   Weight: 139 lb 6.4 oz (63.2 kg)   Height: 4' 11\" (1.499 m)     Body mass index is 28.16 kg/m². Exam:   Appearance: alert, well appearing,  oriented to person, place, and time, acyanotic, in no respiratory distress and well hydrated. HEENT:  NC/AT, pink conj, anicteric sclerae  Neck:  No cervical lymphadenopathy, no JVD, no thyromegaly or nodules noted on exam, no carotid bruit  Heart:  RRR without M/R/G  Lungs:  CTAB, no rhonchi, rales, or wheezes with good air exchange   Abdomen:  Non-tender, pos bowel sounds, no hepatosplenomegaly  Ext:  No C/C/E    Skin: no rash  Neuro: no lateralizing signs, CNs II-XII intact  Breast exam-declined    Assessment/ Plan:   Diagnoses and all orders for this visit:    1. Well woman exam (no gynecological exam)-Advised re: monthly self breast exam, dental prophylaxis Q 6 months, regular exercise, yearly eye exam, daily intake of Ca+D; will obtain copy of last PAP  -     CBC WITH AUTOMATED DIFF; Future  -     METABOLIC PANEL, COMPREHENSIVE; Future  -     LIPID PANEL; Future    2. Type 2 diabetes mellitus with hyperglycemia, without long-term current use of insulin (HCC)-will adjust meds based on A1C  -     HEMOGLOBIN A1C W/O EAG; Future  -     metFORMIN (GLUCOPHAGE) 1,000 mg tablet; TAKE ONE TABLET BY MOUTH TWICE DAILY WITH MEALS  Indications: type 2 diabetes mellitus  -     SITagliptin (JANUVIA) 100 mg tablet; Take 1 Tab by mouth daily. 3. Hyperlipidemia, unspecified hyperlipidemia type-goal LDL is less than 100 bec of hx of CVA  -     LIPID PANEL; Future  -     atorvastatin (LIPITOR) 10 mg tablet;  Take 1 Tab by mouth daily.    4. Essential hypertension with goal blood pressure less than 140/90  -     CBC WITH AUTOMATED DIFF; Future  -     METABOLIC PANEL, COMPREHENSIVE; Future  -     aspirin (ASPIRIN) 325 mg tablet; Take 1 Tab by mouth daily. -     losartan (COZAAR) 50 mg tablet; Take 1 Tab by mouth daily. 5. History of CVA (cerebrovascular accident)-continue with  mg daily    6. Multiple thyroid nodules  -     TSH 3RD GENERATION; Future  -     T4, FREE; Future  -     T3 TOTAL; Future  -     US THYROID/PARATHYROID/SOFT TISS; Future    7. Gastroesophageal reflux disease, esophagitis presence not specified-advised on lifestyle changes; if no relief after 6 weeks, advised to see GI  -     omeprazole (PRILOSEC) 40 mg capsule; Take 1 Cap by mouth daily. 8. Overweight (BMI 25.0-29. 9)-discussed limiting eric to 0354-4531/day and exercising at least 150 min a week        Follow-up and Dispositions    · Return in about 6 months (around 11/21/2019) for follow up. I have discussed the diagnosis with the patient and the intended plan as seen in the above orders. The patient has received an After-Visit Summary and questions were answered concerning future plans. Medication Side Effects and Warnings were discussed with patient: yes    Patient verbalized understanding of above instructions.     Destiny Agosto MD  Internal Medicine  [facility]

## 2019-05-21 NOTE — PATIENT INSTRUCTIONS
Gastroesophageal Reflux Disease (GERD): Care Instructions Your Care Instructions Gastroesophageal reflux disease (GERD) is the backward flow of stomach acid into the esophagus. The esophagus is the tube that leads from your throat to your stomach. A one-way valve prevents the stomach acid from moving up into this tube. When you have GERD, this valve does not close tightly enough. If you have mild GERD symptoms including heartburn, you may be able to control the problem with antacids or over-the-counter medicine. Changing your diet, losing weight, and making other lifestyle changes can also help reduce symptoms. Follow-up care is a key part of your treatment and safety. Be sure to make and go to all appointments, and call your doctor if you are having problems. It's also a good idea to know your test results and keep a list of the medicines you take. How can you care for yourself at home? · Take your medicines exactly as prescribed. Call your doctor if you think you are having a problem with your medicine. · Your doctor may recommend over-the-counter medicine. For mild or occasional indigestion, antacids, such as Tums, Gaviscon, Mylanta, or Maalox, may help. Your doctor also may recommend over-the-counter acid reducers, such as Pepcid AC, Tagamet HB, Zantac 75, or Prilosec. Read and follow all instructions on the label. If you use these medicines often, talk with your doctor. · Change your eating habits. ? It's best to eat several small meals instead of two or three large meals. ? After you eat, wait 2 to 3 hours before you lie down. ? Chocolate, mint, and alcohol can make GERD worse. ? Spicy foods, foods that have a lot of acid (like tomatoes and oranges), and coffee can make GERD symptoms worse in some people. If your symptoms are worse after you eat a certain food, you may want to stop eating that food to see if your symptoms get better. · Do not smoke or chew tobacco. Smoking can make GERD worse. If you need help quitting, talk to your doctor about stop-smoking programs and medicines. These can increase your chances of quitting for good. · If you have GERD symptoms at night, raise the head of your bed 6 to 8 inches by putting the frame on blocks or placing a foam wedge under the head of your mattress. (Adding extra pillows does not work.) · Do not wear tight clothing around your middle. · Lose weight if you need to. Losing just 5 to 10 pounds can help. When should you call for help? Call your doctor now or seek immediate medical care if: 
  · You have new or different belly pain.  
  · Your stools are black and tarlike or have streaks of blood.  
 Watch closely for changes in your health, and be sure to contact your doctor if: 
  · Your symptoms have not improved after 2 days.  
  · Food seems to catch in your throat or chest.  
Where can you learn more? Go to http://georgette-leslie.info/. Enter N670 in the search box to learn more about \"Gastroesophageal Reflux Disease (GERD): Care Instructions. \" Current as of: March 27, 2018 Content Version: 11.9 © 2500-6838 [employer]. Care instructions adapted under license by [employer] (which disclaims liability or warranty for this information). If you have questions about a medical condition or this instruction, always ask your healthcare professional. [place] [address] any warranty or liability for your use of this information.

## 2019-05-22 LAB
ALBUMIN SERPL-MCNC: 4.5 G/DL (ref 3.5–5.5)
ALBUMIN/GLOB SERPL: 1.6 {RATIO} (ref 1.2–2.2)
ALP SERPL-CCNC: 69 IU/L (ref 39–117)
ALT SERPL-CCNC: 46 IU/L (ref 0–32)
AST SERPL-CCNC: 25 IU/L (ref 0–40)
BASOPHILS # BLD AUTO: 0 X10E3/UL (ref 0–0.2)
BASOPHILS NFR BLD AUTO: 0 %
BILIRUB SERPL-MCNC: 0.4 MG/DL (ref 0–1.2)
BUN SERPL-MCNC: 12 MG/DL (ref 6–24)
BUN/CREAT SERPL: 19 (ref 9–23)
CALCIUM SERPL-MCNC: 9.7 MG/DL (ref 8.7–10.2)
CHLORIDE SERPL-SCNC: 101 MMOL/L (ref 96–106)
CHOLEST SERPL-MCNC: 153 MG/DL (ref 100–199)
CO2 SERPL-SCNC: 22 MMOL/L (ref 20–29)
CREAT SERPL-MCNC: 0.64 MG/DL (ref 0.57–1)
EOSINOPHIL # BLD AUTO: 0.1 X10E3/UL (ref 0–0.4)
EOSINOPHIL NFR BLD AUTO: 2 %
ERYTHROCYTE [DISTWIDTH] IN BLOOD BY AUTOMATED COUNT: 13.8 % (ref 12.3–15.4)
GLOBULIN SER CALC-MCNC: 2.9 G/DL (ref 1.5–4.5)
GLUCOSE SERPL-MCNC: 164 MG/DL (ref 65–99)
HBA1C MFR BLD: 7.4 % (ref 4.8–5.6)
HCT VFR BLD AUTO: 40.7 % (ref 34–46.6)
HDLC SERPL-MCNC: 44 MG/DL
HGB BLD-MCNC: 13.9 G/DL (ref 11.1–15.9)
IMM GRANULOCYTES # BLD AUTO: 0 X10E3/UL (ref 0–0.1)
IMM GRANULOCYTES NFR BLD AUTO: 0 %
INTERPRETATION, 910389: NORMAL
LDLC SERPL CALC-MCNC: 82 MG/DL (ref 0–99)
LYMPHOCYTES # BLD AUTO: 3.7 X10E3/UL (ref 0.7–3.1)
LYMPHOCYTES NFR BLD AUTO: 43 %
Lab: NORMAL
MCH RBC QN AUTO: 29.8 PG (ref 26.6–33)
MCHC RBC AUTO-ENTMCNC: 34.2 G/DL (ref 31.5–35.7)
MCV RBC AUTO: 87 FL (ref 79–97)
MONOCYTES # BLD AUTO: 0.4 X10E3/UL (ref 0.1–0.9)
MONOCYTES NFR BLD AUTO: 5 %
NEUTROPHILS # BLD AUTO: 4.3 X10E3/UL (ref 1.4–7)
NEUTROPHILS NFR BLD AUTO: 50 %
PLATELET # BLD AUTO: 260 X10E3/UL (ref 150–450)
POTASSIUM SERPL-SCNC: 3.8 MMOL/L (ref 3.5–5.2)
PROT SERPL-MCNC: 7.4 G/DL (ref 6–8.5)
RBC # BLD AUTO: 4.66 X10E6/UL (ref 3.77–5.28)
SODIUM SERPL-SCNC: 140 MMOL/L (ref 134–144)
T3 SERPL-MCNC: 86 NG/DL (ref 71–180)
T4 FREE SERPL-MCNC: 1.48 NG/DL (ref 0.82–1.77)
TRIGL SERPL-MCNC: 137 MG/DL (ref 0–149)
TSH SERPL DL<=0.005 MIU/L-ACNC: 0.86 UIU/ML (ref 0.45–4.5)
VLDLC SERPL CALC-MCNC: 27 MG/DL (ref 5–40)
WBC # BLD AUTO: 8.6 X10E3/UL (ref 3.4–10.8)

## 2020-02-13 ENCOUNTER — OFFICE VISIT (OUTPATIENT)
Dept: FAMILY MEDICINE CLINIC | Age: 55
End: 2020-02-13

## 2020-02-13 VITALS
BODY MASS INDEX: 26.77 KG/M2 | RESPIRATION RATE: 18 BRPM | WEIGHT: 132.8 LBS | HEIGHT: 59 IN | HEART RATE: 63 BPM | TEMPERATURE: 97.5 F | OXYGEN SATURATION: 99 % | DIASTOLIC BLOOD PRESSURE: 73 MMHG | SYSTOLIC BLOOD PRESSURE: 117 MMHG

## 2020-02-13 DIAGNOSIS — I10 ESSENTIAL HYPERTENSION WITH GOAL BLOOD PRESSURE LESS THAN 140/90: Primary | ICD-10-CM

## 2020-02-13 DIAGNOSIS — K21.9 GASTROESOPHAGEAL REFLUX DISEASE, ESOPHAGITIS PRESENCE NOT SPECIFIED: ICD-10-CM

## 2020-02-13 DIAGNOSIS — E78.5 HYPERLIPIDEMIA, UNSPECIFIED HYPERLIPIDEMIA TYPE: ICD-10-CM

## 2020-02-13 DIAGNOSIS — E11.65 TYPE 2 DIABETES MELLITUS WITH HYPERGLYCEMIA, WITHOUT LONG-TERM CURRENT USE OF INSULIN (HCC): ICD-10-CM

## 2020-02-13 DIAGNOSIS — Z12.39 SCREENING FOR BREAST CANCER: ICD-10-CM

## 2020-02-13 LAB
MICROALBUMIN UR TEST STR-MCNC: 10 MG/L
MICROALBUMIN/CREAT RATIO POC: <30 MG/G

## 2020-02-13 RX ORDER — METFORMIN HYDROCHLORIDE 1000 MG/1
TABLET ORAL
Qty: 180 TAB | Refills: 1 | Status: SHIPPED | OUTPATIENT
Start: 2020-02-13 | End: 2020-09-09 | Stop reason: DRUGHIGH

## 2020-02-13 RX ORDER — ASPIRIN 325 MG
325 TABLET ORAL DAILY
Qty: 90 TAB | Refills: 1 | Status: CANCELLED | OUTPATIENT
Start: 2020-02-13

## 2020-02-13 RX ORDER — OMEPRAZOLE 40 MG/1
40 CAPSULE, DELAYED RELEASE ORAL DAILY
Qty: 30 CAP | Refills: 1 | Status: CANCELLED | OUTPATIENT
Start: 2020-02-13

## 2020-02-13 RX ORDER — IBUPROFEN 800 MG/1
800 TABLET ORAL
Qty: 60 TAB | Refills: 1 | Status: SHIPPED | OUTPATIENT
Start: 2020-02-13 | End: 2022-02-10 | Stop reason: ALTCHOICE

## 2020-02-13 RX ORDER — ATORVASTATIN CALCIUM 10 MG/1
10 TABLET, FILM COATED ORAL DAILY
Qty: 90 TAB | Refills: 1 | Status: SHIPPED | OUTPATIENT
Start: 2020-02-13 | End: 2021-01-10

## 2020-02-13 RX ORDER — LOSARTAN POTASSIUM 50 MG/1
50 TABLET ORAL DAILY
Qty: 90 TAB | Refills: 1 | Status: SHIPPED | OUTPATIENT
Start: 2020-02-13 | End: 2020-09-11

## 2020-02-13 NOTE — PROGRESS NOTES
Chief Complaint   Patient presents with    Follow-up     6 month; patient is fasting     1. Have you been to the ER, urgent care clinic since your last visit? Hospitalized since your last visit? No    2. Have you seen or consulted any other health care providers outside of the [address] [place] [address] since your last visit? Include any pap smears or colon screening.  No

## 2020-02-13 NOTE — PATIENT INSTRUCTIONS

## 2020-02-13 NOTE — PROGRESS NOTES
Chief Complaint   Patient presents with    Follow-up     6 month DM, HTN, Cholesterol; patient is fasting    Diabetic Foot Exam       Pt is a 49y.o. year old female who presents for follow up of her chronic medical problems    Health Maintenance Due   Topic Date Due    DTaP/Tdap/Td series (1 - Tdap)-declined 1977    Shingrix Vaccine Age 50> (1 of 2)-declined 2016    Eye Exam Retinal or Dilated -she will sched with Dr Carlos Section 2018    PAP AKA CERVICAL CYTOLOGY -c/o gyn; HPV neg in 2016 so Q 5 yrs/reviewed 2019    Foot Exam Q1 -today 2019    Breast Cancer Screen Mammogram -will schedule 2019    MICROALBUMIN Q1 -today 2020   mammo due-sister  of breast cancer at 59/diagnosed at age 65; 2 other sister with breast cancer  Declined genetic counseling  Non smoker    Declined breast exam    Wt Readings from Last 3 Encounters:   20 132 lb 12.8 oz (60.2 kg)   19 139 lb 6.4 oz (63.2 kg)   19 136 lb (61.7 kg)   BMI 26  Watching her diet    BP Readings from Last 3 Encounters:   20 117/73   19 116/85   19 120/80       Lab Results   Component Value Date/Time    Hemoglobin A1c 7.0 (H) 2020 09:22 AM    Hemoglobin A1c (POC) 6.6 2019 12:56 PM   Mom on HD form DM; hx of amputation    No longer following with neuro; used to see Dr Johanna Arceo  No headaches  Last MRI in   \"Impressions: On diffusion images, there is no evidence of acute infarction, acute ischemic process or restricted diffusion in brain. 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 Redemonstration of old focal infarction in the right corona radiata. Mild-to-moderate chronic microvascular ischemic changes in white lateral both cerebral hemispheres, redemonstrated. \"    Still on high dose ASA    Lab Results   Component Value Date/Time    Cholesterol, total 136 2020 09:22 AM    HDL Cholesterol 43 02/13/2020 09:22 AM    LDL, calculated 65 02/13/2020 09:22 AM    VLDL, calculated 28 02/13/2020 09:22 AM    Triglyceride 140 02/13/2020 09:22 AM   on Lipitor    meds reviewed: off Prilosec-no longer with GERD; occasional Zantac      No new problems      ROS:    Pt denies: Wt loss, Fever/Chills, HA, Visual changes, Fatigue, Chest pain, SOB, JASON, Abd pain, N/V/D/C, Blood in stool or urine, Edema. Pertinent positive as above in HPI. All others were negative    Patient Active Problem List   Diagnosis Code    Aneurysm of middle cerebral artery I67.1    Hyperglycemia due to type 2 diabetes mellitus ([facility] [address] E11.65    Essential hypertension with goal blood pressure less than 140/90 I10    Gastroesophageal reflux disease K21.9    Multiple thyroid nodules E04.2    Hyperlipidemia E78.5    Overweight (BMI 25.0-29. 9) E66.3    History of CVA (cerebrovascular accident) Z86.73       Past Medical History:   Diagnosis Date    Environmental allergies     Headache(784.0)     hx migraines    HTN (hypertension) 9/23/2013    Hyperlipidemia 6/29/2018    Lacunar infarct, acute ([facility] [address] 2/17/16    admittted at [facility] 2/17-2/19    Menopause     Stroke [facility])     tia 9/10/13    Type II or unspecified type diabetes mellitus without mention of complication, not stated as uncontrolled 10/10/2013       Current Outpatient Medications   Medication Sig Dispense Refill    atorvastatin (LIPITOR) 10 mg tablet Take 1 Tab by mouth daily. 90 Tab 1    ibuprofen (MOTRIN) 800 mg tablet Take 1 Tab by mouth every eight (8) hours as needed for Pain. 60 Tab 1    losartan (COZAAR) 50 mg tablet Take 1 Tab by mouth daily. 90 Tab 1    metFORMIN (GLUCOPHAGE) 1,000 mg tablet TAKE ONE TABLET BY MOUTH TWICE DAILY WITH MEALS  Indications: type 2 diabetes mellitus 180 Tab 1    SITagliptin (JANUVIA) 100 mg tablet Take 1 Tab by mouth daily. 90 Tab 1    aspirin (ASPIRIN) 325 mg tablet Take 1 Tab by mouth daily.  90 Tab 1       Social History Tobacco Use   Smoking Status Never Smoker   Smokeless Tobacco Never Used       Allergies   Allergen Reactions    Lisinopril Cough       Patient Labs were reviewed: yes      Patient Past Records were reviewed:  yes        Objective:     Vitals:    02/13/20 0832   BP: 117/73   Pulse: 63   Resp: 18   Temp: 97.5 °F (36.4 °C)   TempSrc: Oral   SpO2: 99%   Weight: 132 lb 12.8 oz (60.2 kg)   Height: 4' 11\" (1.499 m)     Body mass index is 26.82 kg/m². Exam:   Appearance: alert, well appearing,  oriented to person, place, and time, acyanotic, in no respiratory distress and well hydrated. HEENT:  NC/AT, pink conj, anicteric sclerae  Neck:  No cervical lymphadenopathy, no JVD, no thyromegaly, no carotid bruit  Heart:  RRR without M/R/G  Lungs:  CTAB, no rhonchi, rales, or wheezes with good air exchange   Abdomen:  Non-tender, pos bowel sounds, no hepatosplenomegaly  Ext:  No C/C/E    Skin: no rash  Neuro: no lateralizing signs, CNs II-XII intact  Diabetic foot exam:nornal     Left Foot:   Visual Exam: normal    Pulse DP: 2+ (normal)   Filament test: normal sensation    Vibratory sensation: normal      Right Foot:   Visual Exam: normal    Pulse DP: 2+ (normal)   Filament test: normal sensation    Vibratory sensation: normal      Assessment/ Plan:   Diagnoses and all orders for this visit:    1. Essential hypertension with goal blood pressure less than 140/90-controlled, continue with present meds  -     losartan (COZAAR) 50 mg tablet; Take 1 Tab by mouth daily.  -     CBC WITH AUTOMATED DIFF; Future  -     METABOLIC PANEL, COMPREHENSIVE; Future    2. Hyperlipidemia, unspecified hyperlipidemia type-goal LDL less than 70 bec of DM  -     atorvastatin (LIPITOR) 10 mg tablet; Take 1 Tab by mouth daily.  -     LIPID PANEL; Future    3.  Type 2 diabetes mellitus with hyperglycemia, without long-term current use of insulin (HCC)-will adjust meds once A1c is back  -     metFORMIN (GLUCOPHAGE) 1,000 mg tablet; TAKE ONE TABLET BY MOUTH TWICE DAILY WITH MEALS  Indications: type 2 diabetes mellitus  -     SITagliptin (JANUVIA) 100 mg tablet; Take 1 Tab by mouth daily.  -     HEMOGLOBIN A1C W/O EAG; Future  -     AMB POC URINE, MICROALBUMIN, SEMIQUANTITATIVE  -      DIABETES FOOT EXAM    4. Gastroesophageal reflux disease, esophagitis presence not specified-resolved, off meds; continue with lifestyle changes    5. Screening for breast cancer  -     [facility] MAMMO BI SCREENING INCL CAD; Future    Follow-up and Dispositions    · Return in about 6 months (around 8/13/2020) for follow up. I have discussed the diagnosis with the patient and the intended plan as seen in the above orders. The patient has received an After-Visit Summary and questions were answered concerning future plans. Medication Side Effects and Warnings were discussed with patient: yes    Patient verbalized understanding of above instructions.     Jose Pinzon MD  Internal Medicine  [facility]

## 2020-02-14 LAB
ALBUMIN SERPL-MCNC: 4.7 G/DL (ref 3.8–4.9)
ALBUMIN/GLOB SERPL: 1.7 {RATIO} (ref 1.2–2.2)
ALP SERPL-CCNC: 65 IU/L (ref 39–117)
ALT SERPL-CCNC: 35 IU/L (ref 0–32)
AST SERPL-CCNC: 23 IU/L (ref 0–40)
BASOPHILS # BLD AUTO: 0 X10E3/UL (ref 0–0.2)
BASOPHILS NFR BLD AUTO: 1 %
BILIRUB SERPL-MCNC: 0.4 MG/DL (ref 0–1.2)
BUN SERPL-MCNC: 11 MG/DL (ref 6–24)
BUN/CREAT SERPL: 17 (ref 9–23)
CALCIUM SERPL-MCNC: 9.6 MG/DL (ref 8.7–10.2)
CHLORIDE SERPL-SCNC: 104 MMOL/L (ref 96–106)
CHOLEST SERPL-MCNC: 136 MG/DL (ref 100–199)
CO2 SERPL-SCNC: 23 MMOL/L (ref 20–29)
CREAT SERPL-MCNC: 0.63 MG/DL (ref 0.57–1)
EOSINOPHIL # BLD AUTO: 0.2 X10E3/UL (ref 0–0.4)
EOSINOPHIL NFR BLD AUTO: 2 %
ERYTHROCYTE [DISTWIDTH] IN BLOOD BY AUTOMATED COUNT: 11.9 % (ref 11.7–15.4)
GLOBULIN SER CALC-MCNC: 2.7 G/DL (ref 1.5–4.5)
GLUCOSE SERPL-MCNC: 118 MG/DL (ref 65–99)
HBA1C MFR BLD: 7 % (ref 4.8–5.6)
HCT VFR BLD AUTO: 42.1 % (ref 34–46.6)
HDLC SERPL-MCNC: 43 MG/DL
HGB BLD-MCNC: 14.1 G/DL (ref 11.1–15.9)
IMM GRANULOCYTES # BLD AUTO: 0 X10E3/UL (ref 0–0.1)
IMM GRANULOCYTES NFR BLD AUTO: 0 %
INTERPRETATION, 910389: NORMAL
LDLC SERPL CALC-MCNC: 65 MG/DL (ref 0–99)
LYMPHOCYTES # BLD AUTO: 3.1 X10E3/UL (ref 0.7–3.1)
LYMPHOCYTES NFR BLD AUTO: 36 %
Lab: NORMAL
MCH RBC QN AUTO: 30 PG (ref 26.6–33)
MCHC RBC AUTO-ENTMCNC: 33.5 G/DL (ref 31.5–35.7)
MCV RBC AUTO: 90 FL (ref 79–97)
MONOCYTES # BLD AUTO: 0.6 X10E3/UL (ref 0.1–0.9)
MONOCYTES NFR BLD AUTO: 6 %
NEUTROPHILS # BLD AUTO: 4.8 X10E3/UL (ref 1.4–7)
NEUTROPHILS NFR BLD AUTO: 55 %
PLATELET # BLD AUTO: 267 X10E3/UL (ref 150–450)
POTASSIUM SERPL-SCNC: 4.4 MMOL/L (ref 3.5–5.2)
PROT SERPL-MCNC: 7.4 G/DL (ref 6–8.5)
RBC # BLD AUTO: 4.7 X10E6/UL (ref 3.77–5.28)
SODIUM SERPL-SCNC: 144 MMOL/L (ref 134–144)
TRIGL SERPL-MCNC: 140 MG/DL (ref 0–149)
VLDLC SERPL CALC-MCNC: 28 MG/DL (ref 5–40)
WBC # BLD AUTO: 8.7 X10E3/UL (ref 3.4–10.8)

## 2020-09-09 ENCOUNTER — VIRTUAL VISIT (OUTPATIENT)
Dept: FAMILY MEDICINE CLINIC | Age: 55
End: 2020-09-09

## 2020-09-09 DIAGNOSIS — E78.5 HYPERLIPIDEMIA, UNSPECIFIED HYPERLIPIDEMIA TYPE: ICD-10-CM

## 2020-09-09 DIAGNOSIS — E11.65 TYPE 2 DIABETES MELLITUS WITH HYPERGLYCEMIA, WITHOUT LONG-TERM CURRENT USE OF INSULIN (HCC): Primary | ICD-10-CM

## 2020-09-09 DIAGNOSIS — E04.2 MULTIPLE THYROID NODULES: ICD-10-CM

## 2020-09-09 DIAGNOSIS — Z12.39 SCREENING FOR BREAST CANCER: ICD-10-CM

## 2020-09-09 DIAGNOSIS — J30.2 SEASONAL ALLERGIC RHINITIS, UNSPECIFIED TRIGGER: ICD-10-CM

## 2020-09-09 DIAGNOSIS — I10 ESSENTIAL HYPERTENSION WITH GOAL BLOOD PRESSURE LESS THAN 140/90: ICD-10-CM

## 2020-09-09 RX ORDER — BENZONATATE 100 MG/1
100 CAPSULE ORAL
Qty: 30 CAP | Refills: 1 | Status: SHIPPED | OUTPATIENT
Start: 2020-09-09 | End: 2021-02-11 | Stop reason: SDUPTHER

## 2020-09-09 RX ORDER — METFORMIN HYDROCHLORIDE 500 MG/1
500 TABLET ORAL 2 TIMES DAILY WITH MEALS
Qty: 180 TAB | Refills: 1 | Status: SHIPPED | OUTPATIENT
Start: 2020-09-09 | End: 2021-02-11 | Stop reason: SDUPTHER

## 2020-09-09 NOTE — PROGRESS NOTES
Obdulio Carrion is a 49 y.o. female who was seen by synchronous (real-time) audio-video technology on 9/9/2020 for Follow Up Chronic Condition        Assessment & Plan:   Diagnoses and all orders for this visit:    1. Type 2 diabetes mellitus with hyperglycemia, without long-term current use of insulin (HCC)-ok to dec Metformin to 500 mg BID bec of recent hypoglycemia, continue with Januvia  -     METABOLIC PANEL, COMPREHENSIVE; Future  -     HEMOGLOBIN A1C W/O EAG; Future  -     metFORMIN (GLUCOPHAGE) 500 mg tablet; Take 1 Tab by mouth two (2) times daily (with meals). 2. Multiple thyroid nodules-this was an incidental ficndingon MRA; patient never went for thyroid US previously ordered, will re-order  -     TSH 3RD GENERATION; Future  -     US THYROID/PARATHYROID/SOFT TISS; Future    3. Essential hypertension with goal blood pressure less than 140/90-continue with ARB  -     CBC WITH AUTOMATED DIFF; Future  -     METABOLIC PANEL, COMPREHENSIVE; Future    4. Hyperlipidemia, unspecified hyperlipidemia type-at goal on Lipitor  -     METABOLIC PANEL, COMPREHENSIVE; Future  -     LIPID PANEL; Future    5. Seasonal allergic rhinitis, unspecified trigger  -     CBC WITH AUTOMATED DIFF; Future  -     benzonatate (TESSALON) 100 mg capsule; Take 1 Cap by mouth three (3) times daily as needed for Cough. 6. Screening for breast cancer  -     ARMIN MAMMO BI SCREENING INCL CAD; Future      Follow-up and Dispositions    · Return in about 5 months (around 2/9/2021) for follow up; fasting labs shortly.          712  Subjective:     Health Maintenance Due   Topic Date Due    Pneumococcal 0-64 years (1 of 1 - PPSV23)-declined/gets sick with vaccines 11/13/1972    DTaP/Tdap/Td series (1 - Tdap)-declined 11/13/1987    Shingrix Vaccine Age 50> (1 of 2)-declined 11/13/2016    Eye Exam Retinal or Dilated -Dr Jacquelyn Warren recently, wears eyeglasses 06/23/2018    Breast Cancer Screen Mammogram -did not get a call/Vladimir gary 09/21/2019    Flu Vaccine (1)-declined 09/01/2020     Lab Results   Component Value Date/Time    Hemoglobin A1c 7.0 (H) 02/13/2020 09:22 AM    Hemoglobin A1c (POC) 6.6 02/21/2019 12:56 PM   Denies polyuria, polydipsia and polyphagia  Recent low sugar to 44-cut back on Metformin to 500 BID    Wt Readings from Last 3 Encounters:   02/13/20 132 lb 12.8 oz (60.2 kg)   05/21/19 139 lb 6.4 oz (63.2 kg)   02/21/19 136 lb (61.7 kg)   does not check weight    BP Readings from Last 3 Encounters:   02/13/20 117/73   05/21/19 116/85   02/21/19 120/80   does not check BP at home    Lab Results   Component Value Date/Time    Cholesterol, total 136 02/13/2020 09:22 AM    HDL Cholesterol 43 02/13/2020 09:22 AM    LDL, calculated 65 02/13/2020 09:22 AM    VLDL, calculated 28 02/13/2020 09:22 AM    Triglyceride 140 02/13/2020 09:22 AM   on Lipitor-compliant, no side effects    On ASA for hx of aneurysm    Weather change-needs cough med; allergy during the fall; Tessalon Perles worked well for her    Lab Results   Component Value Date/Time    TSH 0.860 05/21/2019 01:38 AM   multiple thyroid nodules  MR MRA NECK W/WO CONTRAST9/11/2013  [facility]  Result Impression   Impression:  1. Appearance of mild to moderate stenosis origin segment LVA, suggested secondary to a sharp turn  -Artery widely patent more distally with cephalad flow     2. RVA, bilateral carotid arteries widely patent    3. Incidental observation 1 cm RMCA aneurysm. Multiple bilateral thyroid lesions, largest 2 cm left gland. These are nonspecific   -further investigation, including ultrasound, is recommended     I do not see an US done-ordered in 2019 but she did not go    Prior to Admission medications    Medication Sig Start Date End Date Taking? Authorizing Provider   atorvastatin (LIPITOR) 10 mg tablet Take 1 Tab by mouth daily. 2/13/20  Yes Xiomara Rodríguez MD   ibuprofen (MOTRIN) 800 mg tablet Take 1 Tab by mouth every eight (8) hours as needed for Pain. 2/13/20  Yes Xiomara Rodríguez MD   losartan (COZAAR) 50 mg tablet Take 1 Tab by mouth daily. 2/13/20  Yes Xiomara Rodríguez MD   metFORMIN (GLUCOPHAGE) 1,000 mg tablet TAKE ONE TABLET BY MOUTH TWICE DAILY WITH MEALS  Indications: type 2 diabetes mellitus 2/13/20  Yes Xiomara Rodríguez MD   SITagliptin (JANUVIA) 100 mg tablet Take 1 Tab by mouth daily. 2/13/20  Yes Xiomara Rodríguez MD   aspirin (ASPIRIN) 325 mg tablet Take 1 Tab by mouth daily. 5/21/19  Yes Micah Reynoso MD     Patient Active Problem List    Diagnosis Date Noted    Overweight (BMI 25.0-29.9) 09/05/2018    History of CVA (cerebrovascular accident) 09/05/2018    Hyperlipidemia 06/29/2018    Multiple thyroid nodules 08/15/2016    Essential hypertension with goal blood pressure less than 140/90 08/11/2016    Gastroesophageal reflux disease 08/11/2016    Hyperglycemia due to type 2 diabetes mellitus ([facility] [address] 02/25/2016    Aneurysm of middle cerebral artery 09/23/2013       ROS  Pt denies: Wt loss, Fever/Chills, HA, Visual changes, Fatigue, Chest pain, SOB, JASON, Abd pain, N/V/D/C, Blood in stool or urine, Edema. Pertinent positive as above in HPI. All others were negative  Objective:   No flowsheet data found. General: alert, cooperative, no distress   Mental  status: normal mood, behavior, speech, dress, motor activity, and thought processes, able to follow commands   HENT: NCAT   Neck: no visualized mass   Resp: no respiratory distress   Neuro: no gross deficits   Skin: no discoloration or lesions of concern on visible areas   Psychiatric: normal affect, consistent with stated mood, no evidence of hallucinations     Additional exam findings: none      We discussed the expected course, resolution and complications of the diagnosis(es) in detail. Medication risks, benefits, costs, interactions, and alternatives were discussed as indicated.   I advised her to contact the office if her condition worsens, changes or fails to improve as anticipated. She expressed understanding with the diagnosis(es) and plan. Remi Owens, who was evaluated through a patient-initiated, synchronous (real-time) audio-video encounter, and/or her healthcare decision maker, is aware that it is a billable service, with coverage as determined by her insurance carrier. She provided verbal consent to proceed: Yes, and patient identification was verified. It was conducted pursuant to the emergency declaration under the [address] [place] [address] and the [employer] and [employer] General Act. A caregiver was present when appropriate. Ability to conduct physical exam was limited. I was at home. The patient was at home.       Bao Green MD

## 2020-09-09 NOTE — PROGRESS NOTES
1. Have you been to the ER, urgent care clinic since your last visit? Hospitalized since your last visit? No    2. Have you seen or consulted any other health care providers outside of the [address] [place] [address] since your last visit? Include any pap smears or colon screening.  No

## 2020-09-14 NOTE — PATIENT INSTRUCTIONS
DASH Diet: Care Instructions Your Care Instructions The DASH diet is an eating plan that can help lower your blood pressure. DASH stands for Dietary Approaches to Stop Hypertension. Hypertension is high blood pressure. The DASH diet focuses on eating foods that are high in calcium, potassium, and magnesium. These nutrients can lower blood pressure. The foods that are highest in these nutrients are fruits, vegetables, low-fat dairy products, nuts, seeds, and legumes. But taking calcium, potassium, and magnesium supplements instead of eating foods that are high in those nutrients does not have the same effect. The DASH diet also includes whole grains, fish, and poultry. The DASH diet is one of several lifestyle changes your doctor may recommend to lower your high blood pressure. Your doctor may also want you to decrease the amount of sodium in your diet. Lowering sodium while following the DASH diet can lower blood pressure even further than just the DASH diet alone. Follow-up care is a key part of your treatment and safety. Be sure to make and go to all appointments, and call your doctor if you are having problems. It's also a good idea to know your test results and keep a list of the medicines you take. How can you care for yourself at home? Following the DASH diet · Eat 4 to 5 servings of fruit each day. A serving is 1 medium-sized piece of fruit, ½ cup chopped or canned fruit, 1/4 cup dried fruit, or 4 ounces (½ cup) of fruit juice. Choose fruit more often than fruit juice. · Eat 4 to 5 servings of vegetables each day. A serving is 1 cup of lettuce or raw leafy vegetables, ½ cup of chopped or cooked vegetables, or 4 ounces (½ cup) of vegetable juice. Choose vegetables more often than vegetable juice. · Get 2 to 3 servings of low-fat and fat-free dairy each day. A serving is 8 ounces of milk, 1 cup of yogurt, or 1 ½ ounces of cheese. · Eat 6 to 8 servings of grains each day. A serving is 1 slice of bread, 1 ounce of dry cereal, or ½ cup of cooked rice, pasta, or cooked cereal. Try to choose whole-grain products as much as possible. · Limit lean meat, poultry, and fish to 2 servings each day. A serving is 3 ounces, about the size of a deck of cards. · Eat 4 to 5 servings of nuts, seeds, and legumes (cooked dried beans, lentils, and split peas) each week. A serving is 1/3 cup of nuts, 2 tablespoons of seeds, or ½ cup of cooked beans or peas. · Limit fats and oils to 2 to 3 servings each day. A serving is 1 teaspoon of vegetable oil or 2 tablespoons of salad dressing. · Limit sweets and added sugars to 5 servings or less a week. A serving is 1 tablespoon jelly or jam, ½ cup sorbet, or 1 cup of lemonade. · Eat less than 2,300 milligrams (mg) of sodium a day. If you limit your sodium to 1,500 mg a day, you can lower your blood pressure even more. Tips for success · Start small. Do not try to make dramatic changes to your diet all at once. You might feel that you are missing out on your favorite foods and then be more likely to not follow the plan. Make small changes, and stick with them. Once those changes become habit, add a few more changes. · Try some of the following: ? Make it a goal to eat a fruit or vegetable at every meal and at snacks. This will make it easy to get the recommended amount of fruits and vegetables each day. ? Try yogurt topped with fruit and nuts for a snack or healthy dessert. ? Add lettuce, tomato, cucumber, and onion to sandwiches. ? Combine a ready-made pizza crust with low-fat mozzarella cheese and lots of vegetable toppings. Try using tomatoes, squash, spinach, broccoli, carrots, cauliflower, and onions. ? Have a variety of cut-up vegetables with a low-fat dip as an appetizer instead of chips and dip. ? Sprinkle sunflower seeds or chopped almonds over salads.  Or try adding chopped walnuts or almonds to cooked vegetables. ? Try some vegetarian meals using beans and peas. Add garbanzo or kidney beans to salads. Make burritos and tacos with mashed cardoso beans or black beans. Where can you learn more? Go to http://georgette-leslie.info/ Enter Y403 in the search box to learn more about \"DASH Diet: Care Instructions. \" Current as of: December 16, 2019               Content Version: 12.6 © 6352-3402 [employer]. Care instructions adapted under license by [employer] (which disclaims liability or warranty for this information). If you have questions about a medical condition or this instruction, always ask your healthcare professional. [address] any warranty or liability for your use of this information.

## 2020-09-24 ENCOUNTER — HOSPITAL ENCOUNTER (OUTPATIENT)
Dept: MAMMOGRAPHY | Age: 55
Discharge: HOME | End: 2020-09-24
Attending: INTERNAL MEDICINE
Payer: COMMERCIAL

## 2020-09-24 DIAGNOSIS — Z12.39 SCREENING FOR BREAST CANCER: ICD-10-CM

## 2020-09-24 PROCEDURE — 77063 BREAST TOMOSYNTHESIS BI: CPT

## 2020-10-02 ENCOUNTER — APPOINTMENT (OUTPATIENT)
Dept: FAMILY MEDICINE CLINIC | Age: 55
End: 2020-10-02

## 2020-10-02 ENCOUNTER — HOSPITAL ENCOUNTER (OUTPATIENT)
Dept: LAB | Age: 55
Discharge: HOME | End: 2020-10-02

## 2020-10-02 LAB — XX-LABCORP SPECIMEN COL,LCBCF: NORMAL

## 2020-10-02 PROCEDURE — 99001 SPECIMEN HANDLING PT-LAB: CPT

## 2020-10-03 LAB
ALBUMIN SERPL-MCNC: 4.5 G/DL (ref 3.8–4.9)
ALBUMIN/GLOB SERPL: 1.5 {RATIO} (ref 1.2–2.2)
ALP SERPL-CCNC: 62 IU/L (ref 39–117)
ALT SERPL-CCNC: 40 IU/L (ref 0–32)
AST SERPL-CCNC: 24 IU/L (ref 0–40)
BASOPHILS # BLD AUTO: 0 X10E3/UL (ref 0–0.2)
BASOPHILS NFR BLD AUTO: 1 %
BILIRUB SERPL-MCNC: 0.3 MG/DL (ref 0–1.2)
BUN SERPL-MCNC: 15 MG/DL (ref 6–24)
BUN/CREAT SERPL: 23 (ref 9–23)
CALCIUM SERPL-MCNC: 9.6 MG/DL (ref 8.7–10.2)
CHLORIDE SERPL-SCNC: 101 MMOL/L (ref 96–106)
CHOLEST SERPL-MCNC: 128 MG/DL (ref 100–199)
CO2 SERPL-SCNC: 27 MMOL/L (ref 20–29)
CREAT SERPL-MCNC: 0.64 MG/DL (ref 0.57–1)
EOSINOPHIL # BLD AUTO: 0.2 X10E3/UL (ref 0–0.4)
EOSINOPHIL NFR BLD AUTO: 2 %
ERYTHROCYTE [DISTWIDTH] IN BLOOD BY AUTOMATED COUNT: 12.2 % (ref 11.7–15.4)
GLOBULIN SER CALC-MCNC: 3 G/DL (ref 1.5–4.5)
GLUCOSE SERPL-MCNC: 123 MG/DL (ref 65–99)
HBA1C MFR BLD: 7.3 % (ref 4.8–5.6)
HCT VFR BLD AUTO: 41.8 % (ref 34–46.6)
HDLC SERPL-MCNC: 41 MG/DL
HGB BLD-MCNC: 14.4 G/DL (ref 11.1–15.9)
IMM GRANULOCYTES # BLD AUTO: 0 X10E3/UL (ref 0–0.1)
IMM GRANULOCYTES NFR BLD AUTO: 0 %
INTERPRETATION, 910389: NORMAL
LDLC SERPL CALC-MCNC: 69 MG/DL (ref 0–99)
LYMPHOCYTES # BLD AUTO: 2.9 X10E3/UL (ref 0.7–3.1)
LYMPHOCYTES NFR BLD AUTO: 34 %
Lab: NORMAL
MCH RBC QN AUTO: 30.1 PG (ref 26.6–33)
MCHC RBC AUTO-ENTMCNC: 34.4 G/DL (ref 31.5–35.7)
MCV RBC AUTO: 87 FL (ref 79–97)
MONOCYTES # BLD AUTO: 0.6 X10E3/UL (ref 0.1–0.9)
MONOCYTES NFR BLD AUTO: 7 %
NEUTROPHILS # BLD AUTO: 4.7 X10E3/UL (ref 1.4–7)
NEUTROPHILS NFR BLD AUTO: 56 %
PLATELET # BLD AUTO: 271 X10E3/UL (ref 150–450)
POTASSIUM SERPL-SCNC: 4.5 MMOL/L (ref 3.5–5.2)
PROT SERPL-MCNC: 7.5 G/DL (ref 6–8.5)
RBC # BLD AUTO: 4.79 X10E6/UL (ref 3.77–5.28)
SODIUM SERPL-SCNC: 142 MMOL/L (ref 134–144)
TRIGL SERPL-MCNC: 96 MG/DL (ref 0–149)
TSH SERPL DL<=0.005 MIU/L-ACNC: 0.86 UIU/ML (ref 0.45–4.5)
VLDLC SERPL CALC-MCNC: 18 MG/DL (ref 5–40)
WBC # BLD AUTO: 8.5 X10E3/UL (ref 3.4–10.8)

## 2021-02-11 ENCOUNTER — OFFICE VISIT (OUTPATIENT)
Dept: FAMILY MEDICINE CLINIC | Age: 56
End: 2021-02-11
Payer: COMMERCIAL

## 2021-02-11 VITALS
DIASTOLIC BLOOD PRESSURE: 86 MMHG | HEART RATE: 67 BPM | SYSTOLIC BLOOD PRESSURE: 122 MMHG | OXYGEN SATURATION: 97 % | RESPIRATION RATE: 18 BRPM | BODY MASS INDEX: 26.22 KG/M2 | TEMPERATURE: 97.5 F | WEIGHT: 129.8 LBS

## 2021-02-11 DIAGNOSIS — E11.65 TYPE 2 DIABETES MELLITUS WITH HYPERGLYCEMIA, WITHOUT LONG-TERM CURRENT USE OF INSULIN (HCC): ICD-10-CM

## 2021-02-11 DIAGNOSIS — Z86.73 HISTORY OF CVA (CEREBROVASCULAR ACCIDENT): ICD-10-CM

## 2021-02-11 DIAGNOSIS — Z00.00 WELL WOMAN EXAM (NO GYNECOLOGICAL EXAM): Primary | ICD-10-CM

## 2021-02-11 DIAGNOSIS — E78.5 HYPERLIPIDEMIA, UNSPECIFIED HYPERLIPIDEMIA TYPE: ICD-10-CM

## 2021-02-11 DIAGNOSIS — Z23 NEED FOR 23-POLYVALENT PNEUMOCOCCAL POLYSACCHARIDE VACCINE: ICD-10-CM

## 2021-02-11 DIAGNOSIS — I10 ESSENTIAL HYPERTENSION WITH GOAL BLOOD PRESSURE LESS THAN 140/90: ICD-10-CM

## 2021-02-11 DIAGNOSIS — E04.2 MULTIPLE THYROID NODULES: ICD-10-CM

## 2021-02-11 DIAGNOSIS — J30.2 SEASONAL ALLERGIC RHINITIS, UNSPECIFIED TRIGGER: ICD-10-CM

## 2021-02-11 PROCEDURE — 3051F HG A1C>EQUAL 7.0%<8.0%: CPT | Performed by: INTERNAL MEDICINE

## 2021-02-11 PROCEDURE — 99396 PREV VISIT EST AGE 40-64: CPT | Performed by: INTERNAL MEDICINE

## 2021-02-11 RX ORDER — IBUPROFEN 800 MG/1
800 TABLET ORAL
Qty: 60 TAB | Refills: 1 | Status: CANCELLED | OUTPATIENT
Start: 2021-02-11

## 2021-02-11 RX ORDER — LOSARTAN POTASSIUM 50 MG/1
TABLET ORAL
Qty: 90 TAB | Refills: 1 | Status: SHIPPED | OUTPATIENT
Start: 2021-02-11 | End: 2021-08-13 | Stop reason: SDUPTHER

## 2021-02-11 RX ORDER — BENZONATATE 100 MG/1
100 CAPSULE ORAL
Qty: 30 CAP | Refills: 1 | Status: SHIPPED | OUTPATIENT
Start: 2021-02-11

## 2021-02-11 RX ORDER — METFORMIN HYDROCHLORIDE 500 MG/1
500 TABLET ORAL 2 TIMES DAILY WITH MEALS
Qty: 180 TAB | Refills: 1 | Status: SHIPPED | OUTPATIENT
Start: 2021-02-11 | End: 2021-07-13 | Stop reason: SDUPTHER

## 2021-02-11 NOTE — PROGRESS NOTES
Chief Complaint   Patient presents with    Follow Up Chronic Condition     HTN, Cholesterol     1. Have you been to the ER, urgent care clinic since your last visit? Hospitalized since your last visit? No    2. Have you seen or consulted any other health care providers outside of the [address] [place] [address] since your last visit? Include any pap smears or colon screening.  No     Health Maintenance Due   Topic Date Due    Pneumococcal 0-64 years (1 of 1 - PPSV23) 11/13/1972    COVID-19 Vaccine (1 of 2) 11/13/1982    DTaP/Tdap/Td series (1 - Tdap) 11/13/1987    Shingrix Vaccine Age 50> (1 of 2) 11/13/2016    Eye Exam Retinal or Dilated  06/23/2018    Flu Vaccine (1) 09/01/2020    Foot Exam Q1  02/13/2021    MICROALBUMIN Q1  02/13/2021

## 2021-02-11 NOTE — PROGRESS NOTES
Chief Complaint   Patient presents with    Follow Up Chronic Condition     HTN, Cholesterol    Annual Wellness Visit       Pt is a 48y.o. year old female who presents for follow up of her chronic medical problems/annual wellness visit    Health Maintenance Due   Topic Date Due    Pneumococcal 0-64 years (1 of 1 - PPSV23)-Pneumovax 21 today bec of DM 11/13/1972    COVID-19 Vaccine (1 of 2)-willing to get 11/13/1982    DTaP/Tdap/Td series (1 - Tdap)-next visit 11/13/1987    Shingrix Vaccine Age 50> (1 of 2)-advised to get this at her pharmacy 11/13/2016   [facility] Los[facility] Eye Exam Retinal or Dilated -Dr Rodney Jolley, last year; wears eyeglasses 06/23/2018    Flu Vaccine (1)-declined/gets sick 09/01/2020    Foot Exam Q1 -today 02/13/2021    MICROALBUMIN Q1 -today 02/13/2021   Sees Dr Patsy Sanders for PAP-HPV neg in 2016  mammo due next year  Colonoscopy up to date Q 8 yrs    Mom had Covid but she does not live with her (mom on dialysis)    BP Readings from Last 3 Encounters:   02/11/21 122/86   02/13/20 117/73   05/21/19 116/85     Lab Results   Component Value Date/Time    Hemoglobin A1c 7.4 (H) 02/11/2021 10:31 AM    Hemoglobin A1c (POC) 6.6 02/21/2019 12:56 PM   Denies polyuria, polydipsia and polyphagia    Fasting today    Lab Results   Component Value Date/Time    Cholesterol, total 141 02/11/2021 10:31 AM    HDL Cholesterol 45 02/11/2021 10:31 AM    LDL, calculated 72 02/11/2021 10:31 AM    LDL, calculated 65 02/13/2020 09:22 AM    VLDL, calculated 24 02/11/2021 10:31 AM    VLDL, calculated 28 02/13/2020 09:22 AM    Triglyceride 135 02/11/2021 10:31 AM   On Lipitor-compliant    Hx of TIA-on daily ASA  S/p aneurysm repair-denies headaches    Thyroid nodules since high school-told to eat seaweed/deafood; denies any sxs  Did not go for thyroid US-but agreed to do it this time  Lab Results   Component Value Date/Time    TSH 0.862 10/02/2020 08:46 AM   incidental finding on MRA neck done 2016:  FINDINGS:   Incidental thyroid nodules, largest on the left measures 1.8 cm, not well evaluated on this exam.    ROS:    Pt denies: Wt loss, Fever/Chills, HA, Visual changes, Fatigue, Chest pain, SOB, JASON, Abd pain, N/V/D/C, Blood in stool or urine, Edema. Pertinent positive as above in HPI. All others were negative    Patient Active Problem List   Diagnosis Code    Aneurysm of middle cerebral artery I67.1    Hyperglycemia due to type 2 diabetes mellitus ([facility] [address] E11.65    Essential hypertension with goal blood pressure less than 140/90 I10    Gastroesophageal reflux disease K21.9    Multiple thyroid nodules E04.2    Hyperlipidemia E78.5    Overweight (BMI 25.0-29. 9) E66.3    History of CVA (cerebrovascular accident) Z86.73       Past Medical History:   Diagnosis Date    Environmental allergies     Headache(784.0)     hx migraines    HTN (hypertension) 9/23/2013    Hyperlipidemia 6/29/2018    Lacunar infarct, acute ([facility] [address] 2/17/16    [facility]mitt[facility]/17[facility]2/[facility]    Menopause     Stroke [facility])     tia 9/10/13    Type II or unspecified type diabetes mellitus without mention of complication, not stated as uncontrolled 10/10/2013       Current Outpatient Medications   Medication Sig Dispense Refill    metFORMIN (GLUCOPHAGE) 500 mg tablet Take 1 Tab by mouth two (2) times daily (with meals). 180 Tab 1    losartan (COZAAR) 50 mg tablet Take 1 tablet by mouth once daily 90 Tab 1    SITagliptin (Januvia) 100 mg tablet Take 1 tablet by mouth once daily 90 Tab 1    benzonatate (TESSALON) 100 mg capsule Take 1 Cap by mouth three (3) times daily as needed for Cough. 30 Cap 1    atorvastatin (LIPITOR) 10 mg tablet Take 1 tablet by mouth once daily 90 Tab 1    ibuprofen (MOTRIN) 800 mg tablet Take 1 Tab by mouth every eight (8) hours as needed for Pain. 60 Tab 1    aspirin (ASPIRIN) 325 mg tablet Take 1 Tab by mouth daily.  90 Tab 1       Social History     Tobacco Use   Smoking Status Never Smoker   Smokeless Tobacco Never Used       Allergies Allergen Reactions    Lisinopril Cough       Patient Labs were reviewed: yes      Patient Past Records were reviewed:  yes        Objective:     Vitals:    02/11/21 0929   BP: 122/86   Pulse: 67   Resp: 18   Temp: 97.5 °F (36.4 °C)   TempSrc: Temporal   SpO2: 97%   Weight: 129 lb 12.8 oz (58.9 kg)     Body mass index is 26.22 kg/m². Exam:   Appearance: alert, well appearing,  oriented to person, place, and time, acyanotic, in no respiratory distress and well hydrated. HEENT:  NC/AT, pink conj, anicteric sclerae  Neck:  No cervical lymphadenopathy, no JVD, no thyromegaly, no carotid bruit  Heart:  RRR without M/R/G  Lungs:  CTAB, no rhonchi, rales, or wheezes with good air exchange   Abdomen:  Non-tender, pos bowel sounds, no hepatosplenomegaly  Ext:  No C/C/E    Skin: no rash  Neuro: no lateralizing signs, CNs II-XII intact  Diabetic foot exam:     Left Foot:   Visual Exam: normal    Pulse DP: 2+ (normal)   Filament test: normal sensation    Vibratory sensation: normal      Right Foot:   Visual Exam: normal    Pulse DP: 2+ (normal)   Filament test: normal sensation    Vibratory sensation: normal      Assessment/ Plan:   Diagnoses and all orders for this visit:    1. Well woman exam (no gynecological exam)-Advised re: monthly self breast exam, dental prophylaxis Q 6 months, regular exercise, yearly eye exam, daily intake of Ca+D  -     CBC WITH AUTOMATED DIFF; Future  -     METABOLIC PANEL, COMPREHENSIVE; Future  -     LIPID PANEL; Future  -     PNEUMOCOCCAL POLYSACCHARIDE VACCINE, 23-VALENT, ADULT OR IMMUNOSUPPRESSED PT DOSE,    2. Type 2 diabetes mellitus with hyperglycemia, without long-term current use of insulin (HCC)-goal A1C of less than 7 beco f risk factors  -     metFORMIN (GLUCOPHAGE) 500 mg tablet; Take 1 Tab by mouth two (2) times daily (with meals). -     SITagliptin (Januvia) 100 mg tablet;  Take 1 tablet by mouth once daily  -     MICROALBUMIN, UR, RAND W/ MICROALB/CREAT RATIO; Future  -     HM DIABETES FOOT EXAM  -     HEMOGLOBIN A1C WITH EAG; Future  -     PNEUMOCOCCAL POLYSACCHARIDE VACCINE, 23-VALENT, ADULT OR IMMUNOSUPPRESSED PT DOSE,    3. Essential hypertension with goal blood pressure less than 140/90-controlled, continue with present meds  -     losartan (COZAAR) 50 mg tablet; Take 1 tablet by mouth once daily  -     CBC WITH AUTOMATED DIFF; Future  -     METABOLIC PANEL, COMPREHENSIVE; Future  -     LIPID PANEL; Future    4. Multiple thyroid nodules-will do US as previously ordered  -     TSH 3RD GENERATION; Future  -     T4, FREE; Future  -     T3 TOTAL; Future  -     US THYROID/PARATHYROID/SOFT TISS; Future    5. Seasonal allergic rhinitis, unspecified trigger  -     benzonatate (TESSALON) 100 mg capsule; Take 1 Cap by mouth three (3) times daily as needed for Cough. 6. Hyperlipidemia, unspecified hyperlipidemia type-goal LDL of less than 70, on Lipitor  -     LIPID PANEL; Future    7. History of CVA (cerebrovascular accident) -continue with daily ASA    8. Hx of brain aneurysm repair-currently asymtomatic    8. Need for 23-polyvalent pneumococcal polysaccharide vaccine  -     PNEUMOCOCCAL POLYSACCHARIDE VACCINE, 23-VALENT, ADULT OR IMMUNOSUPPRESSED PT DOSE,          Follow-up and Dispositions    · Return in about 6 months (around 8/11/2021) for follow up. I have discussed the diagnosis with the patient and the intended plan as seen in the above orders. The patient has received an After-Visit Summary and questions were answered concerning future plans. Medication Side Effects and Warnings were discussed with patient: yes    Patient verbalized understanding of above instructions.     Umm Ramesh MD  Internal Medicine  [facility] Alert-The patient is alert, awake and responds to voice. The patient is oriented to time, place, and person. The triage nurse is able to obtain subjective information.

## 2021-02-11 NOTE — PROGRESS NOTES
After obtaining consent, and per orders of Dr. Elbert Self, injection of Pneumovax 23 given by Lizeth Freed LPN. Patient tolerated injection well, with no signs or symptoms of adverse reactions noted.

## 2021-02-11 NOTE — PATIENT INSTRUCTIONS
Thyroid Nodules: Care Instructions  Your Care Instructions  Thyroid nodules are growths or lumps in the thyroid gland. Your thyroid is in the front of your neck. It controls how your body uses energy. You may have tests to see if the nodule is caused by cancer. Most nodules aren't cancer and don't cause problems. Many don't even need treatment. If you do have cancer, it can usually be cured. Treatment will probably include surgery. You may also get radioactive iodine treatment. If your thyroid can't make thyroid hormone after treatment, you can take a pill every day to replace the hormone. Follow-up care is a key part of your treatment and safety. Be sure to make and go to all appointments, and call your doctor if you are having problems. It's also a good idea to know your test results and keep a list of the medicines you take. How can you care for yourself at home? · Be safe with medicines. If you take thyroid hormone medicine:  ? Take it exactly as prescribed. Call your doctor if you think you are having a problem with your medicine. If you take the right amount and don't skip doses, you probably won't have side effects. ? Tell your doctor about any medicines you take. This includes over-the-counter medicines. When should you call for help? Call 911 anytime you think you may need emergency care. For example, call if:    · You lose consciousness. Call your doctor now or seek immediate medical care if:    · You have shortness of breath. Watch closely for changes in your health, and be sure to contact your doctor if:    · You have pain in your neck, jaw, or ear.     · You have problems swallowing.     · You feel weak and tired.     · You have nervousness, a fast heartbeat, hand tremors, problems sleeping, increased sweating, and weight loss.     · You do not feel better even though you are taking your medicine. Where can you learn more?   Go to http://www.gray.com/  Enter A217 in the search box to learn more about \"Thyroid Nodules: Care Instructions. \"  Current as of: March 31, 2020               Content Version: 12.6  © 2859-5941 [employer]. Care instructions adapted under license by [employer] (which disclaims liability or warranty for this information). If you have questions about a medical condition or this instruction, always ask your healthcare professional. [place] [address] any warranty or liability for your use of this information.

## 2021-02-12 LAB
ALBUMIN SERPL-MCNC: 4.7 G/DL (ref 3.8–4.9)
ALBUMIN/CREAT UR: 3 MG/G CREAT (ref 0–29)
ALBUMIN/GLOB SERPL: 1.6 {RATIO} (ref 1.2–2.2)
ALP SERPL-CCNC: 63 IU/L (ref 39–117)
ALT SERPL-CCNC: 26 IU/L (ref 0–32)
AST SERPL-CCNC: 20 IU/L (ref 0–40)
BASOPHILS # BLD AUTO: 0 X10E3/UL (ref 0–0.2)
BASOPHILS NFR BLD AUTO: 1 %
BILIRUB SERPL-MCNC: 0.6 MG/DL (ref 0–1.2)
BUN SERPL-MCNC: 14 MG/DL (ref 6–24)
BUN/CREAT SERPL: 22 (ref 9–23)
CALCIUM SERPL-MCNC: 10.1 MG/DL (ref 8.7–10.2)
CHLORIDE SERPL-SCNC: 101 MMOL/L (ref 96–106)
CHOLEST SERPL-MCNC: 141 MG/DL (ref 100–199)
CO2 SERPL-SCNC: 26 MMOL/L (ref 20–29)
CREAT SERPL-MCNC: 0.63 MG/DL (ref 0.57–1)
CREAT UR-MCNC: 105 MG/DL
EOSINOPHIL # BLD AUTO: 0.1 X10E3/UL (ref 0–0.4)
EOSINOPHIL NFR BLD AUTO: 1 %
ERYTHROCYTE [DISTWIDTH] IN BLOOD BY AUTOMATED COUNT: 12.4 % (ref 11.7–15.4)
EST. AVERAGE GLUCOSE BLD GHB EST-MCNC: 166 MG/DL
GLOBULIN SER CALC-MCNC: 2.9 G/DL (ref 1.5–4.5)
GLUCOSE SERPL-MCNC: 111 MG/DL (ref 65–99)
HBA1C MFR BLD: 7.4 % (ref 4.8–5.6)
HCT VFR BLD AUTO: 43.3 % (ref 34–46.6)
HDLC SERPL-MCNC: 45 MG/DL
HGB BLD-MCNC: 14.8 G/DL (ref 11.1–15.9)
IMM GRANULOCYTES # BLD AUTO: 0 X10E3/UL (ref 0–0.1)
IMM GRANULOCYTES NFR BLD AUTO: 0 %
INTERPRETATION, 910389: NORMAL
LDLC SERPL CALC-MCNC: 72 MG/DL (ref 0–99)
LYMPHOCYTES # BLD AUTO: 2.6 X10E3/UL (ref 0.7–3.1)
LYMPHOCYTES NFR BLD AUTO: 32 %
Lab: NORMAL
MCH RBC QN AUTO: 30.5 PG (ref 26.6–33)
MCHC RBC AUTO-ENTMCNC: 34.2 G/DL (ref 31.5–35.7)
MCV RBC AUTO: 89 FL (ref 79–97)
MICROALBUMIN UR-MCNC: 3.2 UG/ML
MONOCYTES # BLD AUTO: 0.5 X10E3/UL (ref 0.1–0.9)
MONOCYTES NFR BLD AUTO: 6 %
NEUTROPHILS # BLD AUTO: 5 X10E3/UL (ref 1.4–7)
NEUTROPHILS NFR BLD AUTO: 60 %
PLATELET # BLD AUTO: 266 X10E3/UL (ref 150–450)
POTASSIUM SERPL-SCNC: 4.2 MMOL/L (ref 3.5–5.2)
PROT SERPL-MCNC: 7.6 G/DL (ref 6–8.5)
RBC # BLD AUTO: 4.85 X10E6/UL (ref 3.77–5.28)
SODIUM SERPL-SCNC: 142 MMOL/L (ref 134–144)
TRIGL SERPL-MCNC: 135 MG/DL (ref 0–149)
VLDLC SERPL CALC-MCNC: 24 MG/DL (ref 5–40)
WBC # BLD AUTO: 8.3 X10E3/UL (ref 3.4–10.8)

## 2021-02-15 PROCEDURE — 90471 IMMUNIZATION ADMIN: CPT | Performed by: INTERNAL MEDICINE

## 2021-02-15 PROCEDURE — 90732 PPSV23 VACC 2 YRS+ SUBQ/IM: CPT | Performed by: INTERNAL MEDICINE

## 2021-07-12 DIAGNOSIS — E11.65 TYPE 2 DIABETES MELLITUS WITH HYPERGLYCEMIA, WITHOUT LONG-TERM CURRENT USE OF INSULIN (HCC): ICD-10-CM

## 2021-07-12 DIAGNOSIS — E78.5 HYPERLIPIDEMIA, UNSPECIFIED HYPERLIPIDEMIA TYPE: ICD-10-CM

## 2021-07-12 RX ORDER — METFORMIN HYDROCHLORIDE 500 MG/1
500 TABLET ORAL 2 TIMES DAILY WITH MEALS
Qty: 180 TABLET | Refills: 1 | Status: CANCELLED | OUTPATIENT
Start: 2021-07-12

## 2021-07-13 DIAGNOSIS — E11.65 TYPE 2 DIABETES MELLITUS WITH HYPERGLYCEMIA, WITHOUT LONG-TERM CURRENT USE OF INSULIN (HCC): ICD-10-CM

## 2021-07-13 RX ORDER — METFORMIN HYDROCHLORIDE 500 MG/1
500 TABLET ORAL 2 TIMES DAILY WITH MEALS
Qty: 180 TABLET | Refills: 1 | Status: SHIPPED | OUTPATIENT
Start: 2021-07-13 | End: 2021-07-18 | Stop reason: DRUGHIGH

## 2021-07-18 DIAGNOSIS — E11.65 TYPE 2 DIABETES MELLITUS WITH HYPERGLYCEMIA, WITHOUT LONG-TERM CURRENT USE OF INSULIN (HCC): Primary | ICD-10-CM

## 2021-07-18 RX ORDER — METFORMIN HYDROCHLORIDE 1000 MG/1
1000 TABLET ORAL 2 TIMES DAILY WITH MEALS
Qty: 180 TABLET | Refills: 0 | Status: SHIPPED | OUTPATIENT
Start: 2021-07-18 | End: 2021-08-13 | Stop reason: SDUPTHER

## 2021-08-12 NOTE — PROGRESS NOTES
Assessment/ Plan:   Diagnoses and all orders for this visit:    1. Type 2 diabetes mellitus with hyperglycemia, without long-term current use of insulin (HCC)--will adjust meds once A1c is back; on full dose of Metformin; Januvia-high copay  Patient wants to be on sulfonylurea like her sister but I would rather she be on Faxiga or Jardiance-discussed how these meds are better  -     HEMOGLOBIN A1C WITH EAG  -     metFORMIN (GLUCOPHAGE) 1,000 mg tablet; Take 1 Tablet by mouth two (2) times daily (with meals). Advised to sched eye exam shortly    2. Essential hypertension with goal blood pressure less than 140/90-controlled, continue with current med  -     CBC WITH AUTOMATED DIFF  -     METABOLIC PANEL, COMPREHENSIVE  -     losartan (COZAAR) 50 mg tablet; Take 1 tablet by mouth once daily    3. Hyperlipidemia, unspecified hyperlipidemia type-goal LDL of less than 70 on Lipitor  -     LIPID PANEL  -     atorvastatin (LIPITOR) 10 mg tablet; Take 1 tablet by mouth once daily    4. Multiple thyroid nodules-currently euthyroid; declined to do thyroid US bec of high co-pay  -     TSH 3RD GENERATION    5. Need for hepatitis C screening test  -     HEPATITIS C AB      Tdap next visit      Follow-up and Dispositions    · Return in about 3 months (around 11/13/2021) for follow up.            Chief Complaint   Patient presents with    Follow Up Chronic Condition       Pt is a 48y.o. year old female who presents for follow up of her chronic medical problems    Health Maintenance Due   Topic Date Due    Hepatitis C Screening -today Never done    COVID-19 Vaccine (1)-done Never done    DTaP/Tdap/Td series (1 - Tdap) Never done    Shingrix Vaccine Age 50> (1 of 2)-advised to get this at her pharmacy Never done   [place] Draft Eye Exam Retinal or Dilated -wears eyeglasses 06/23/2018    PAP AKA CERVICAL CYTOLOGY -c/o gyn 07/24/2021       Wt Readings from Last 3 Encounters:   08/13/21 135 lb 3.2 oz (61.3 kg)   02/11/21 129 lb 12.8 oz (58.9 kg)   02/13/20 132 lb 12.8 oz (60.2 kg)       BP Readings from Last 3 Encounters:   08/13/21 135/79   02/11/21 122/86   02/13/20 117/73       Lab Results   Component Value Date/Time    Hemoglobin A1c 8.3 (H) 08/13/2021 11:39 AM    Hemoglobin A1c (POC) 6.6 02/21/2019 12:56 PM   Denies polyuria, polydipsia and polyphagia  Meds reviewed:Metformin 1000 BID-able to tolerate  Off Januvia-high co pay  Does not like the needles to check blood sugar    Lab Results   Component Value Date/Time    Cholesterol, total 132 08/13/2021 11:39 AM    HDL Cholesterol 46 08/13/2021 11:39 AM    LDL, calculated 65 08/13/2021 11:39 AM    LDL, calculated 65 02/13/2020 09:22 AM    VLDL, calculated 21 08/13/2021 11:39 AM    VLDL, calculated 28 02/13/2020 09:22 AM    Triglyceride 113 08/13/2021 11:39 AM       Hx of thyroid nodule-did she do US ordered? No,she had a high co pay  Lab Results   Component Value Date/Time    TSH 0.695 08/13/2021 11:39 AM        ROS:    Pt denies: Wt loss, Fever/Chills, HA, Visual changes, Fatigue, Chest pain, SOB, JASNO, Abd pain, N/V/D/C, Blood in stool or urine, Edema. Pertinent positive as above in HPI. All others were negative    Patient Active Problem List   Diagnosis Code    Aneurysm of middle cerebral artery I67.1    Hyperglycemia due to type 2 diabetes mellitus ([facility] [address] E11.65    Essential hypertension with goal blood pressure less than 140/90 I10    Gastroesophageal reflux disease K21.9    Multiple thyroid nodules E04.2    Hyperlipidemia E78.5    Overweight (BMI 25.0-29. 9) E66.3    History of CVA (cerebrovascular accident) Z86.73       Past Medical History:   Diagnosis Date    Environmental allergies     Headache(784.0)     hx migraines    HTN (hypertension) 9/23/2013    Hyperlipidemia 6/29/2018    Lacunar infarct, acute ([address] 2/17/16    admittted at [facility] 2/17-2/19    Menopause     [facility])     tia 9/10/13    Type II or unspecified type diabetes mellitus without mention of complication, not stated as uncontrolled 10/10/2013       Current Outpatient Medications   Medication Sig Dispense Refill    metFORMIN (GLUCOPHAGE) 1,000 mg tablet Take 1 Tablet by mouth two (2) times daily (with meals). 180 Tablet 2    atorvastatin (LIPITOR) 10 mg tablet Take 1 tablet by mouth once daily 90 Tablet 1    losartan (COZAAR) 50 mg tablet Take 1 tablet by mouth once daily 90 Tablet 1    benzonatate (TESSALON) 100 mg capsule Take 1 Cap by mouth three (3) times daily as needed for Cough. 30 Cap 1    ibuprofen (MOTRIN) 800 mg tablet Take 1 Tab by mouth every eight (8) hours as needed for Pain. 60 Tab 1    aspirin (ASPIRIN) 325 mg tablet Take 1 Tab by mouth daily. 90 Tab 1       Social History     Tobacco Use   Smoking Status Never Smoker   Smokeless Tobacco Never Used       Allergies   Allergen Reactions    Lisinopril Cough       Patient Labs were reviewed: yes    Patient Past Records were reviewed: yes      Objective:     Vitals:    08/13/21 1128   BP: 135/79   Pulse: 63   Temp: 98.2 °F (36.8 °C)   TempSrc: Temporal   Weight: 135 lb 3.2 oz (61.3 kg)   Height: 4' 11\" (1.499 m)     Body mass index is 27.31 kg/m². Exam:   Appearance: alert, well appearing,  oriented to person, place, and time, acyanotic, in no respiratory distress and well hydrated. HEENT:  NC/AT, pink conj, anicteric sclerae  Neck:  No cervical lymphadenopathy, no JVD, no thyromegaly, no carotid bruit  Heart:  RRR without M/R/G  Lungs:  CTAB, no rhonchi, rales, or wheezes with good air exchange   Abdomen:  Non-tender, pos bowel sounds, no hepatosplenomegaly  Ext:  No C/C/E    Skin: no rash  Neuro: no lateralizing signs, CNs II-XII intact            I have discussed the diagnosis with the patient and the intended plan as seen in the above orders. The patient has received an After-Visit Summary and questions were answered concerning future plans.      Medication Side Effects and Warnings were discussed with patient: yes    Patient verbalized understanding of above instructions.     Loralie Klinefelter, MD  Internal Medicine  [facility]

## 2021-08-13 ENCOUNTER — OFFICE VISIT (OUTPATIENT)
Dept: FAMILY MEDICINE CLINIC | Age: 56
End: 2021-08-13
Payer: COMMERCIAL

## 2021-08-13 VITALS
TEMPERATURE: 98.2 F | BODY MASS INDEX: 27.26 KG/M2 | DIASTOLIC BLOOD PRESSURE: 79 MMHG | WEIGHT: 135.2 LBS | SYSTOLIC BLOOD PRESSURE: 135 MMHG | HEIGHT: 59 IN | HEART RATE: 63 BPM

## 2021-08-13 DIAGNOSIS — E04.2 MULTIPLE THYROID NODULES: ICD-10-CM

## 2021-08-13 DIAGNOSIS — I10 ESSENTIAL HYPERTENSION WITH GOAL BLOOD PRESSURE LESS THAN 140/90: ICD-10-CM

## 2021-08-13 DIAGNOSIS — E78.5 HYPERLIPIDEMIA, UNSPECIFIED HYPERLIPIDEMIA TYPE: ICD-10-CM

## 2021-08-13 DIAGNOSIS — E11.65 TYPE 2 DIABETES MELLITUS WITH HYPERGLYCEMIA, WITHOUT LONG-TERM CURRENT USE OF INSULIN (HCC): Primary | ICD-10-CM

## 2021-08-13 DIAGNOSIS — Z11.59 NEED FOR HEPATITIS C SCREENING TEST: ICD-10-CM

## 2021-08-13 PROCEDURE — 3052F HG A1C>EQUAL 8.0%<EQUAL 9.0%: CPT | Performed by: INTERNAL MEDICINE

## 2021-08-13 PROCEDURE — 99214 OFFICE O/P EST MOD 30 MIN: CPT | Performed by: INTERNAL MEDICINE

## 2021-08-13 RX ORDER — METFORMIN HYDROCHLORIDE 1000 MG/1
1000 TABLET ORAL 2 TIMES DAILY WITH MEALS
Qty: 180 TABLET | Refills: 2 | Status: SHIPPED | OUTPATIENT
Start: 2021-08-13 | End: 2021-11-09 | Stop reason: SDUPTHER

## 2021-08-13 RX ORDER — LOSARTAN POTASSIUM 50 MG/1
TABLET ORAL
Qty: 90 TABLET | Refills: 1 | Status: SHIPPED | OUTPATIENT
Start: 2021-08-13 | End: 2021-11-09 | Stop reason: SDUPTHER

## 2021-08-13 RX ORDER — ATORVASTATIN CALCIUM 10 MG/1
TABLET, FILM COATED ORAL
Qty: 90 TABLET | Refills: 1 | Status: SHIPPED | OUTPATIENT
Start: 2021-08-13 | End: 2021-11-09 | Stop reason: SDUPTHER

## 2021-08-13 NOTE — PROGRESS NOTES
Patient seen for routine follow up without concerns. 1. Have you been to the ER, urgent care clinic since your last visit? Hospitalized since your last visit? No    2. Have you seen or consulted any other health care providers outside of the [address] [place] [address] since your last visit? Include any pap smears or colon screening.  No     Health Maintenance Due   Topic Date Due    Hepatitis C Screening  Never done    DTaP/Tdap/Td series (1 - Tdap) Never done    Shingrix Vaccine Age 50> (1 of 2) Never done    Eye Exam Retinal or Dilated  06/23/2018    PAP AKA CERVICAL CYTOLOGY  07/24/2021

## 2021-08-13 NOTE — PATIENT INSTRUCTIONS

## 2021-08-16 LAB
ALBUMIN SERPL-MCNC: 4.4 G/DL (ref 3.8–4.9)
ALBUMIN/GLOB SERPL: 1.6 {RATIO} (ref 1.2–2.2)
ALP SERPL-CCNC: 68 IU/L (ref 48–121)
ALT SERPL-CCNC: 34 IU/L (ref 0–32)
AST SERPL-CCNC: 24 IU/L (ref 0–40)
BASOPHILS # BLD AUTO: 0 X10E3/UL (ref 0–0.2)
BASOPHILS NFR BLD AUTO: 1 %
BILIRUB SERPL-MCNC: 0.5 MG/DL (ref 0–1.2)
BUN SERPL-MCNC: 10 MG/DL (ref 6–24)
BUN/CREAT SERPL: 16 (ref 9–23)
CALCIUM SERPL-MCNC: 9.6 MG/DL (ref 8.7–10.2)
CHLORIDE SERPL-SCNC: 103 MMOL/L (ref 96–106)
CHOLEST SERPL-MCNC: 132 MG/DL (ref 100–199)
CO2 SERPL-SCNC: 24 MMOL/L (ref 20–29)
CREAT SERPL-MCNC: 0.64 MG/DL (ref 0.57–1)
EOSINOPHIL # BLD AUTO: 0.2 X10E3/UL (ref 0–0.4)
EOSINOPHIL NFR BLD AUTO: 2 %
ERYTHROCYTE [DISTWIDTH] IN BLOOD BY AUTOMATED COUNT: 12.4 % (ref 11.7–15.4)
EST. AVERAGE GLUCOSE BLD GHB EST-MCNC: 192 MG/DL
GLOBULIN SER CALC-MCNC: 2.8 G/DL (ref 1.5–4.5)
GLUCOSE SERPL-MCNC: 114 MG/DL (ref 65–99)
HBA1C MFR BLD: 8.3 % (ref 4.8–5.6)
HCT VFR BLD AUTO: 42.6 % (ref 34–46.6)
HCV AB S/CO SERPL IA: <0.1 S/CO RATIO (ref 0–0.9)
HDLC SERPL-MCNC: 46 MG/DL
HGB BLD-MCNC: 14 G/DL (ref 11.1–15.9)
IMM GRANULOCYTES # BLD AUTO: 0 X10E3/UL (ref 0–0.1)
IMM GRANULOCYTES NFR BLD AUTO: 0 %
IMP & REVIEW OF LAB RESULTS: NORMAL
LDLC SERPL CALC-MCNC: 65 MG/DL (ref 0–99)
LYMPHOCYTES # BLD AUTO: 3.2 X10E3/UL (ref 0.7–3.1)
LYMPHOCYTES NFR BLD AUTO: 38 %
MCH RBC QN AUTO: 30.1 PG (ref 26.6–33)
MCHC RBC AUTO-ENTMCNC: 32.9 G/DL (ref 31.5–35.7)
MCV RBC AUTO: 92 FL (ref 79–97)
MONOCYTES # BLD AUTO: 0.6 X10E3/UL (ref 0.1–0.9)
MONOCYTES NFR BLD AUTO: 7 %
NEUTROPHILS # BLD AUTO: 4.4 X10E3/UL (ref 1.4–7)
NEUTROPHILS NFR BLD AUTO: 52 %
PLATELET # BLD AUTO: 229 X10E3/UL (ref 150–450)
POTASSIUM SERPL-SCNC: 4.2 MMOL/L (ref 3.5–5.2)
PROT SERPL-MCNC: 7.2 G/DL (ref 6–8.5)
RBC # BLD AUTO: 4.65 X10E6/UL (ref 3.77–5.28)
SODIUM SERPL-SCNC: 143 MMOL/L (ref 134–144)
TRIGL SERPL-MCNC: 113 MG/DL (ref 0–149)
TSH SERPL DL<=0.005 MIU/L-ACNC: 0.69 UIU/ML (ref 0.45–4.5)
VLDLC SERPL CALC-MCNC: 21 MG/DL (ref 5–40)
WBC # BLD AUTO: 8.4 X10E3/UL (ref 3.4–10.8)

## 2021-08-24 DIAGNOSIS — E11.65 TYPE 2 DIABETES MELLITUS WITH HYPERGLYCEMIA, WITHOUT LONG-TERM CURRENT USE OF INSULIN (HCC): Primary | ICD-10-CM

## 2021-09-13 DIAGNOSIS — E11.65 TYPE 2 DIABETES MELLITUS WITH HYPERGLYCEMIA, WITHOUT LONG-TERM CURRENT USE OF INSULIN (HCC): Primary | ICD-10-CM

## 2021-09-13 RX ORDER — DULAGLUTIDE 0.75 MG/.5ML
0.75 INJECTION, SOLUTION SUBCUTANEOUS
Qty: 4 PEN | Refills: 0 | Status: SHIPPED | OUTPATIENT
Start: 2021-09-13 | End: 2021-11-09

## 2021-09-17 RX ORDER — ATORVASTATIN CALCIUM 10 MG/1
TABLET, FILM COATED ORAL
Qty: 90 TABLET | Refills: 1 | Status: CANCELLED | OUTPATIENT
Start: 2021-09-17

## 2021-10-01 ENCOUNTER — HOSPITAL ENCOUNTER (OUTPATIENT)
Dept: WOMENS IMAGING | Age: 56
Discharge: HOME | End: 2021-10-01
Attending: INTERNAL MEDICINE
Payer: COMMERCIAL

## 2021-10-01 DIAGNOSIS — Z12.31 VISIT FOR SCREENING MAMMOGRAM: ICD-10-CM

## 2021-10-01 PROCEDURE — 77063 BREAST TOMOSYNTHESIS BI: CPT

## 2021-11-09 ENCOUNTER — OFFICE VISIT (OUTPATIENT)
Dept: FAMILY MEDICINE CLINIC | Age: 56
End: 2021-11-09
Payer: COMMERCIAL

## 2021-11-09 VITALS
RESPIRATION RATE: 16 BRPM | SYSTOLIC BLOOD PRESSURE: 118 MMHG | BODY MASS INDEX: 26.51 KG/M2 | WEIGHT: 131.5 LBS | TEMPERATURE: 98.4 F | HEIGHT: 59 IN | OXYGEN SATURATION: 98 % | HEART RATE: 71 BPM | DIASTOLIC BLOOD PRESSURE: 77 MMHG

## 2021-11-09 DIAGNOSIS — E04.2 MULTIPLE THYROID NODULES: ICD-10-CM

## 2021-11-09 DIAGNOSIS — Z23 ENCOUNTER FOR IMMUNIZATION: ICD-10-CM

## 2021-11-09 DIAGNOSIS — I10 ESSENTIAL HYPERTENSION WITH GOAL BLOOD PRESSURE LESS THAN 140/90: ICD-10-CM

## 2021-11-09 DIAGNOSIS — E78.5 HYPERLIPIDEMIA, UNSPECIFIED HYPERLIPIDEMIA TYPE: ICD-10-CM

## 2021-11-09 DIAGNOSIS — E11.65 TYPE 2 DIABETES MELLITUS WITH HYPERGLYCEMIA, WITHOUT LONG-TERM CURRENT USE OF INSULIN (HCC): Primary | ICD-10-CM

## 2021-11-09 LAB — HBA1C MFR BLD HPLC: 6.9 %

## 2021-11-09 PROCEDURE — 90686 IIV4 VACC NO PRSV 0.5 ML IM: CPT | Performed by: INTERNAL MEDICINE

## 2021-11-09 PROCEDURE — 90471 IMMUNIZATION ADMIN: CPT | Performed by: INTERNAL MEDICINE

## 2021-11-09 PROCEDURE — 99214 OFFICE O/P EST MOD 30 MIN: CPT | Performed by: INTERNAL MEDICINE

## 2021-11-09 PROCEDURE — 83036 HEMOGLOBIN GLYCOSYLATED A1C: CPT | Performed by: INTERNAL MEDICINE

## 2021-11-09 RX ORDER — METFORMIN HYDROCHLORIDE 1000 MG/1
1000 TABLET ORAL 2 TIMES DAILY WITH MEALS
Qty: 180 TABLET | Refills: 2 | Status: SHIPPED | OUTPATIENT
Start: 2021-11-09 | End: 2022-02-10 | Stop reason: SDUPTHER

## 2021-11-09 RX ORDER — LOSARTAN POTASSIUM 50 MG/1
TABLET ORAL
Qty: 90 TABLET | Refills: 2 | Status: SHIPPED | OUTPATIENT
Start: 2021-11-09 | End: 2022-02-10 | Stop reason: SDUPTHER

## 2021-11-09 RX ORDER — ATORVASTATIN CALCIUM 10 MG/1
TABLET, FILM COATED ORAL
Qty: 90 TABLET | Refills: 2 | Status: SHIPPED | OUTPATIENT
Start: 2021-11-09 | End: 2022-05-12 | Stop reason: SDUPTHER

## 2021-11-09 NOTE — Clinical Note
NOTIFICATION RETURN TO WORK / SCHOOL    11/9/2021 10:25 AM    Ms. Shantanu Mak, [address] [id number]      To Whom It May Concern:    Laurie Theodore is currently under the care of Yuli Meraz. She will return to work/school on: ***    If there are questions or concerns please have the patient contact our office.         Sincerely,      Marcos Kussmaul, MD

## 2021-11-09 NOTE — PROGRESS NOTES
Patient seen for routine follow up without concerns. 1. Have you been to the ER, urgent care clinic since your last visit? Hospitalized since your last visit? No    2. Have you seen or consulted any other health care providers outside of the [address] [place] [address] since your last visit? Include any pap smears or colon screening. No     Patient was given VIS for review,consent was obtained and per orders of Dr. Amy Ag, [facility] observed. No signs nor symptoms of any adverse reactions. Patient tolerated injection well.

## 2021-11-09 NOTE — PATIENT INSTRUCTIONS
Thyroid Nodules: Care Instructions  Your Care Instructions  Thyroid nodules are growths or lumps in the thyroid gland. Your thyroid is in the front of your neck. It controls how your body uses energy. You may have tests to see if the nodule is caused by cancer. Most nodules aren't cancer and don't cause problems. Many don't even need treatment. If you do have cancer, it can usually be cured. Treatment will probably include surgery. You may also get radioactive iodine treatment. If your thyroid can't make thyroid hormone after treatment, you can take a pill every day to replace the hormone. Follow-up care is a key part of your treatment and safety. Be sure to make and go to all appointments, and call your doctor if you are having problems. It's also a good idea to know your test results and keep a list of the medicines you take. How can you care for yourself at home? · Be safe with medicines. If you take thyroid hormone medicine:  ? Take it exactly as prescribed. Call your doctor if you think you are having a problem with your medicine. If you take the right amount and don't skip doses, you probably won't have side effects. ? Tell your doctor about any medicines you take. This includes over-the-counter medicines. When should you call for help? Call 911 anytime you think you may need emergency care. For example, call if:    · You lose consciousness. Call your doctor now or seek immediate medical care if:    · You have shortness of breath. Watch closely for changes in your health, and be sure to contact your doctor if:    · You have pain in your neck, jaw, or ear.     · You have problems swallowing.     · You feel weak and tired.     · You have nervousness, a fast heartbeat, hand tremors, problems sleeping, increased sweating, and weight loss.     · You do not feel better even though you are taking your medicine. Where can you learn more?   Go to http://www.gray.com/  Enter Q564 in the search box to learn more about \"Thyroid Nodules: Care Instructions. \"  Current as of: December 2, 2020               Content Version: 13.0  © 2006-2021 [employer]. Care instructions adapted under license by [employer] (which disclaims liability or warranty for this information). If you have questions about a medical condition or this instruction, always ask your healthcare professional. [place] [address] any warranty or liability for your use of this information.

## 2021-11-09 NOTE — LETTER
11/9/2021 10:26 AM    Ms. Alena Gray, [address] [id number]              Sincerely,      Robinson Forde MD

## 2021-11-09 NOTE — PROGRESS NOTES
Assessment/ Plan:   Diagnoses and all orders for this visit:    1. Type 2 diabetes mellitus with hyperglycemia, without long-term current use of insulin (HCC)-continue with Metformin and will see if her insurance will cover Rybelsus otherwise, she will get back on Farxiga  -     metFORMIN (GLUCOPHAGE) 1,000 mg tablet; Take 1 Tablet by mouth two (2) times daily (with meals). -     AMB POC HEMOGLOBIN A1C  -     semaglutide (Rybelsus) 3 mg tablet; Take 1 Tablet by mouth Daily (before breakfast). 2. Multiple thyroid nodules-advised she needs to do this since largest nodule was 1.8 cm in 2016; recent [id number] [facility] normal  -     US THYROID/PARATHYROID/SOFT TISS; Future    3. Hyperlipidemia, unspecified hyperlipidemia type-goal LDL of less than 70   -     atorvastatin (LIPITOR) 10 mg tablet; Take 1 tablet by mouth once daily    4. Essential hypertension with goal blood pressure less than 140/90-controlled, continue with present meds  -     losartan (COZAAR) 50 mg tablet; Take 1 tablet by mouth once daily    5. Encounter for immunization  -     THER/PROPH/DIAG INJECTION, SUBCUT/IM  -     INFLUENZA VIRUS VAC QUAD,SPLIT,PRESV FREE SYRINGE IM    Gyn did genetic test bec of family hx of breast cancer-2 sisters ; prostate cancer-maternal uncle; leukemia      Follow-up and Dispositions    · Return in about 3 months (around 2022) for follow up.   Routing History                 Chief Complaint   Patient presents with    Follow Up Chronic Condition       Pt is a 55y.o. year old female who presents for follow up of her chronic medical problems    Health Maintenance Due   Topic Date Due    DTaP/Tdap/Td series (1 - Tdap) Never done    Shingrix Vaccine Age 50> (1 of 2) Never done    Eye Exam Retinal or Dilated -wears eyeglasses 2018    Cervical cancer screen -saw Gyn/Dr Erin Castelan 2019    Flu Vaccine (1) Never done     Gyn did genetic test bec of family hx of breast cancer-2 sisters ; prostate cancer-maternal uncle; leukemia    Wt Readings from Last 3 Encounters:   11/09/21 131 lb 8 oz (59.6 kg)   08/13/21 135 lb 3.2 oz (61.3 kg)   02/11/21 129 lb 12.8 oz (58.9 kg)       Lab Results   Component Value Date/Time    Hemoglobin A1c 8.3 (H) 08/13/2021 11:39 AM    Hemoglobin A1c (POC) 6.9 11/09/2021 09:43 AM   Meds reviewed:  Denies polyuria, polydipsia and polyphagia  Took Trulicity for 1 month then restarted Madelia and no rash  Would like to try oral since not a fan of injection    BP Readings from Last 3 Encounters:   11/09/21 118/77   08/13/21 135/79   02/11/21 122/86       Lab Results   Component Value Date/Time    Cholesterol, total 132 08/13/2021 11:39 AM    HDL Cholesterol 46 08/13/2021 11:39 AM    LDL, calculated 65 08/13/2021 11:39 AM    LDL, calculated 65 02/13/2020 09:22 AM    VLDL, calculated 21 08/13/2021 11:39 AM    VLDL, calculated 28 02/13/2020 09:22 AM    Triglyceride 113 08/13/2021 11:39 AM      Did not do thyroid US bec of high deductible of over $600  Lab Results   Component Value Date/Time    TSH 0.695 08/13/2021 11:39 AM   MRA in 2016, incidental finding:   FINDINGS:   Incidental thyroid nodules, largest on the left measures 1.8 cm, not well evaluated on this exam.     Will try to do thyroid US at [facility] [place]  Occasional choking sensation      ROS:    Pt denies: Wt loss, Fever/Chills, HA, Visual changes, Fatigue, Chest pain, SOB, JASON, Abd pain, N/V/D/C, Blood in stool or urine, Edema. Pertinent positive as above in HPI. All others were negative    Patient Active Problem List   Diagnosis Code    Aneurysm of middle cerebral artery I67.1    Hyperglycemia due to type 2 diabetes mellitus ([facility] [address] E11.65    Essential hypertension with goal blood pressure less than 140/90 I10    Gastroesophageal reflux disease K21.9    Multiple thyroid nodules E04.2    Hyperlipidemia E78.5    Overweight (BMI 25.0-29. 9) E66.3    History of CVA (cerebrovascular accident) Z80.78       Past Medical History:   Diagnosis Date    Environmental allergies     Headache(784.0)     hx migraines    HTN (hypertension) 9/23/2013    Hyperlipidemia 6/29/2018    Lacunar infarct, acute ([address] 2/17/16    admittted at [facility] 2/17-2/19    Menopause     Stroke [facility])     tia 9/10/13    Type II or unspecified type diabetes mellitus without mention of complication, not stated as uncontrolled 10/10/2013       Current Outpatient Medications   Medication Sig Dispense Refill    dulaglutide (Trulicity) 5.88 GO/5.5 mL sub-q pen 0.5 mL by SubCUTAneous route every seven (7) days. 4 Pen 0    metFORMIN (GLUCOPHAGE) 1,000 mg tablet Take 1 Tablet by mouth two (2) times daily (with meals). 180 Tablet 2    atorvastatin (LIPITOR) 10 mg tablet Take 1 tablet by mouth once daily 90 Tablet 1    losartan (COZAAR) 50 mg tablet Take 1 tablet by mouth once daily 90 Tablet 1    benzonatate (TESSALON) 100 mg capsule Take 1 Cap by mouth three (3) times daily as needed for Cough. 30 Cap 1    ibuprofen (MOTRIN) 800 mg tablet Take 1 Tab by mouth every eight (8) hours as needed for Pain. 60 Tab 1    aspirin (ASPIRIN) 325 mg tablet Take 1 Tab by mouth daily. 90 Tab 1    dapagliflozin (Farxiga) 10 mg tab tablet Take 1 Tablet by mouth daily. (Patient not taking: Reported on 11/9/2021) 30 Tablet 3       Social History     Tobacco Use   Smoking Status Never Smoker   Smokeless Tobacco Never Used       Allergies   Allergen Reactions    Lisinopril Cough       Patient Labs were reviewed: yes    Patient Past Records were reviewed: yes      Objective:     Vitals:    11/09/21 0927   BP: 118/77   Pulse: 71   Resp: 16   Temp: 98.4 °F (36.9 °C)   TempSrc: Temporal   SpO2: 98%   Weight: 131 lb 8 oz (59.6 kg)   Height: 4' 11\" (1.499 m)     Body mass index is 26.56 kg/m². Exam:   Appearance: alert, well appearing,  oriented to person, place, and time, acyanotic, in no respiratory distress and well hydrated.   HEENT:  NC/AT, pink conj, anicteric sclerae  Neck:  No cervical lymphadenopathy, no JVD, no thyromegaly, no carotid bruit  Heart:  RRR without M/R/G  Lungs:  CTAB, no rhonchi, rales, or wheezes with good air exchange   Abdomen:  Non-tender, pos bowel sounds, no hepatosplenomegaly  Ext:  No C/C/E    Skin: no rash  Neuro: no lateralizing signs, CNs II-XII intact            I have discussed the diagnosis with the patient and the intended plan as seen in the above orders. The patient has received an After-Visit Summary and questions were answered concerning future plans. Medication Side Effects and Warnings were discussed with patient: yes    Patient verbalized understanding of above instructions.     Emma Rodriguez MD  Internal Medicine  [facility]

## 2021-11-09 NOTE — LETTER
NOTIFICATION RETURN TO WORK / SCHOOL    11/9/2021 10:25 AM    Ms. Christian Majano, [address] [id number]      To Whom It May Concern:    Rayo Gayle is currently under the care of Yuli Meraz. She will return to work/school on: 11/09/2021    If there are questions or concerns please have the patient contact our office.         Sincerely,      Jacob Moore MD

## 2022-02-10 ENCOUNTER — OFFICE VISIT (OUTPATIENT)
Dept: FAMILY MEDICINE CLINIC | Age: 57
End: 2022-02-10
Payer: COMMERCIAL

## 2022-02-10 VITALS
HEART RATE: 63 BPM | RESPIRATION RATE: 16 BRPM | WEIGHT: 133.5 LBS | BODY MASS INDEX: 26.91 KG/M2 | TEMPERATURE: 98.4 F | OXYGEN SATURATION: 97 % | HEIGHT: 59 IN | DIASTOLIC BLOOD PRESSURE: 82 MMHG | SYSTOLIC BLOOD PRESSURE: 116 MMHG

## 2022-02-10 DIAGNOSIS — I10 ESSENTIAL HYPERTENSION: ICD-10-CM

## 2022-02-10 DIAGNOSIS — E78.5 HYPERLIPIDEMIA, UNSPECIFIED HYPERLIPIDEMIA TYPE: ICD-10-CM

## 2022-02-10 DIAGNOSIS — E04.2 MULTIPLE THYROID NODULES: ICD-10-CM

## 2022-02-10 DIAGNOSIS — Z86.73 HISTORY OF CVA (CEREBROVASCULAR ACCIDENT): ICD-10-CM

## 2022-02-10 DIAGNOSIS — M54.2 NECK PAIN, ACUTE: ICD-10-CM

## 2022-02-10 DIAGNOSIS — E11.65 TYPE 2 DIABETES MELLITUS WITH HYPERGLYCEMIA, WITHOUT LONG-TERM CURRENT USE OF INSULIN (HCC): Primary | ICD-10-CM

## 2022-02-10 DIAGNOSIS — Z13.71 BRCA2 GENE MUTATION NEGATIVE: ICD-10-CM

## 2022-02-10 DIAGNOSIS — Z80.3 FAMILY HISTORY OF BREAST CANCER IN SISTER: ICD-10-CM

## 2022-02-10 PROCEDURE — 99214 OFFICE O/P EST MOD 30 MIN: CPT | Performed by: INTERNAL MEDICINE

## 2022-02-10 RX ORDER — METFORMIN HYDROCHLORIDE 1000 MG/1
1000 TABLET ORAL 2 TIMES DAILY WITH MEALS
Qty: 180 TABLET | Refills: 2 | Status: SHIPPED | OUTPATIENT
Start: 2022-02-10 | End: 2022-05-12 | Stop reason: ALTCHOICE

## 2022-02-10 RX ORDER — LOSARTAN POTASSIUM 50 MG/1
TABLET ORAL
Qty: 90 TABLET | Refills: 2 | Status: SHIPPED | OUTPATIENT
Start: 2022-02-10 | End: 2022-05-12 | Stop reason: SDUPTHER

## 2022-02-10 RX ORDER — NAPROXEN 500 MG/1
500 TABLET ORAL
Qty: 60 TABLET | Refills: 0 | Status: SHIPPED | OUTPATIENT
Start: 2022-02-10

## 2022-02-10 NOTE — PATIENT INSTRUCTIONS
Breast Cancer (BRCA) Gene Testing: Care Instructions  Overview     BRCA1 and BRCA2 are genes that help control normal cell growth. Sometimes, people inherit changes in one of these genes. These changes are called mutations. If you inherit a mutation in a BRCA (say \"Kaila\") gene, you have a greater risk of breast and ovarian cancers as well as some other cancers, such as prostate and pancreatic cancers. BRCA gene changes aren't common. If you are concerned that you may have a BRCA gene change, talk with your doctor. You can have genetic testing to find out if you have the BRCA mutation. A test may look just for BRCA gene changes. Or you may have a multigene panel test that also looks for other genes that can raise your cancer risk. Follow-up care is a key part of your treatment and safety. Be sure to make and go to all appointments, and call your doctor if you are having problems. It's also a good idea to know your test results and keep a list of the medicines you take. Why is the test done? A BRCA blood test is done to learn if you have BRCA gene changes. You may feel better if the test shows that you don't have a BRCA mutation. If the test shows that you do have a BRCA mutation, you may be able to make some decisions that could reduce your cancer risk. What happens after a breast cancer gene (BRCA) test?  The results of a BRCA gene test can help you find out how high your cancer risk is. If it is high, you might decide to take steps to lower your risk. There are several things you might do, such as:  · Have checkups and tests more often. · Have surgery to remove your breasts. · Have surgery to remove your ovaries. · Take medicines that may help prevent breast cancer. What are the risks of the test?  A negative test may give you a false sense of security. So you may not have the regular tests that help find cancer at an early stage.  But a negative BRCA test does not mean that you will never have breast or ovarian cancer. A positive test result may cause anxiety or depression. A positive BRCA test does not mean that you will definitely get breast or ovarian cancer. What can you do to reduce the risk of breast cancer? Your risk for breast cancer increases as you get older. There is no known way to prevent breast cancer. But with some cancers, finding them early can increase your chances of successful treatment. Here are some steps you can take to help reduce your risk:  · Get familiar with the look and feel of your breasts. This will help you notice any changes. Call your doctor if you notice a change. · Have regular breast exams by your doctor or nurse. Ask your doctor how often you should get them. · Have regular mammograms. A mammogram is a picture of your breast tissue. It can find changes in your breast before you can feel them. Talk to your doctor about when to get this test.  You can also help take care of yourself and reduce your risk of cancer if you:  · Stay at a healthy weight. · Eat a healthy, low-fat diet. · Get some exercise every day. If you don't usually exercise, walking is a good way to start. · Don't smoke. If you need help quitting, talk to your doctor about stop-smoking programs and medicines. These can increase your chances of quitting for good. · If you drink alcohol, limit how much you drink. Any amount of alcohol may increase your risk for some types of cancer. · Breastfeed. There is some evidence that breastfeeding may lower the risk of breast cancer. The benefit seems to be greatest in those who have  for longer than 12 months or who  several children. BRCA gene changes  People who do a gene test and find out that they have a BRCA gene change have some options to manage their cancer risk. · For female breast cancer:  If you haven't yet had cancer, you may want to think about starting breast cancer screening at a younger age, taking medicine, and having preventive surgery. · For male breast cancer: You may want to think about doing breast self-exams and having clinical breast exams. (And it's a good idea to have prostate cancer screenings too.)  If you have a BRCA gene change, talk with your doctor about how you can manage your cancer risk. Where can you learn more? Go to http://www.gray.com/  Enter U252 in the search box to learn more about \"Breast Cancer (BRCA) Gene Testing: Care Instructions. \"  Current as of: December 17, 2020               Content Version: 13.0  © 4594-9012 [employer]. Care instructions adapted under license by [employer] (which disclaims liability or warranty for this information). If you have questions about a medical condition or this instruction, always ask your healthcare professional. [address] any warranty or liability for your use of this information.

## 2022-02-10 NOTE — PROGRESS NOTES
Patient seen for routine follow up c/o Right shoulder pain x one week    1. \"Have you been to the ER, urgent care clinic since your last visit? Hospitalized since your last visit? \" No    2. \"Have you seen or consulted any other health care providers outside of the [address] [place] [address] since your last visit? \" Yes Genetic Testing     3. For patients aged 39-70: Has the patient had a colonoscopy / FIT/ Cologuard? Yes      If the patient is female:    4. For patients aged 41-77: Has the patient had a mammogram within the past 2 years? No      5. For patients aged 21-65: Has the patient had a pap smear?  Yes - no Care Gap present      Health Maintenance Due   Topic Date Due    DTaP/Tdap/Td series (1 - Tdap) Never done    Shingrix Vaccine Age 50> (1 of 2) Never done    Cervical cancer screen  07/24/2019    Foot Exam Q1  02/11/2022    MICROALBUMIN Q1  02/11/2022

## 2022-02-10 NOTE — PROGRESS NOTES
Assessment/ Plan:   Diagnoses and all orders for this visit:    1. Type 2 diabetes mellitus with hyperglycemia, without long-term current use of insulin (HCC)-if A1c is elevated will re-try Farxiga  -     metFORMIN (GLUCOPHAGE) 1,000 mg tablet; Take 1 Tablet by mouth two (2) times daily (with meals). -     losartan (COZAAR) 50 mg tablet; Take 1 tablet by mouth once daily  -     MICROALBUMIN, UR, RAND W/ MICROALB/CREAT RATIO; Future  -     HEMOGLOBIN A1C WITH EAG; Future  Declines injections  Does not check blood sugars  microalb-neg  GFR-normal  Eye exam:10/2021 with Dr Iona Radford retinopathy    2. Essential hypertension-controlled,continue with present meds  -     losartan (COZAAR) 50 mg tablet; Take 1 tablet by mouth once daily  -     CBC WITH AUTOMATED DIFF; Future  -     METABOLIC PANEL, COMPREHENSIVE; Future    3. Multiple thyroid nodules-declines to do the US  -     TSH 3RD GENERATION; Future    4. Hyperlipidemia, unspecified hyperlipidemia type-goal LDL of less than 70 on Lipitor  -     LIPID PANEL; Future    5. History of CVA (cerebrovascular accident)-continue with daily ASA    6. BRCA2 gene mutation negative-will obtain copy of this result and forward it to [facility]  BRCA/panel results reviewed and discussed; BRCA=2 POS   Patient quoted lifetime breast cancer risk of 45-69%   Surgical risk reduction discussed including bilateral mastectomy and oophorectomy   Cancer Prevention Plan developed including recommendations for increased imaging surveillance (screening MMG with CT alternating with breast MRI q6m), lifestyle modifications and avoidance of hormone-based medications including OTC     7. Family history of breast cancer in 3 of her sisters, youngest breast cancer patient was in her early 29's when diagnosed    6.  Neck pain-likely musculoskeletal, will rx short term NSAIDs    Await a1c and add Farxiga if A1c is above 7.5  Follow-up and Dispositions    · Return in about 3 months (around 5/10/2022) for follow up. Routing History               Chief Complaint   Patient presents with    Follow Up Chronic Condition    Shoulder Pain     right       Pt is a 55y.o. year old female who presents for follow up of her chronic medical problems    Health Maintenance Due   Topic Date Due    DTaP/Tdap/Td series (1 - Tdap) Never done    Shingrix Vaccine Age 50> (1 of 2) Never done    Cervical cancer screen -c/o Dr Andre Caceres 07/24/2019    Foot Exam Q1  02/11/2022    MICROALBUMIN Q1 -today 02/11/2022      Wt Readings from Last 3 Encounters:   02/10/22 133 lb 8 oz (60.6 kg)   11/09/21 131 lb 8 oz (59.6 kg)   08/13/21 135 lb 3.2 oz (61.3 kg)       Lab Results   Component Value Date/Time    Hemoglobin A1c 8.3 (H) 08/13/2021 11:39 AM    Hemoglobin A1c (POC) 6.9 11/09/2021 09:43 AM   Able to tolerate Rybelsus?  Stopped this after 1 month bec insurance did not cover  Willing to try Farxiga-itchy rash that was transient  Declines injections  Does not check blood sugars  microalb-neg  GFR-normal  Eye exam:10/2021 with Dr Cain Sepulveda retinopathy    BP Readings from Last 3 Encounters:   02/10/22 116/82   11/09/21 118/77   08/13/21 135/79   on ARB    Lab Results   Component Value Date/Time    Cholesterol, total 132 08/13/2021 11:39 AM    HDL Cholesterol 46 08/13/2021 11:39 AM    LDL, calculated 65 08/13/2021 11:39 AM    LDL, calculated 65 02/13/2020 09:22 AM    VLDL, calculated 21 08/13/2021 11:39 AM    VLDL, calculated 28 02/13/2020 09:22 AM    Triglyceride 113 08/13/2021 11:39 AM   On Lipitor  Fasting today    Lab Results   Component Value Date/Time    TSH 0.695 08/13/2021 11:39 AM   ?thyroid US -declines bec no sxs; knew of thyroid issue since her college days  Eats a lot of seafood at the buffet she says!!  Incidental finding on MRA neck done 2016    Incidental thyroid nodules, largest on the left measures 1.8 cm, not well evaluated on this exam.     Right sided neck pain rad to the upper arm-excessive cell phone use she thinks  Has not taken any OTC meds; uses efficascent oil which helps  Right handed    Saw Gyn-Dr Chandana Goins  Breast cancer in 3 of her sisters  Assessment and Plan      1. Family history of malignant neoplasm of breast in first degree relative   2. BRCA2 gene mutation positive   BRCA/panel results reviewed and discussed; BRCA=2 POS   Patient quoted lifetime breast cancer risk of 45-69%   Surgical risk reduction discussed including bilateral mastectomy and oophorectomy   Cancer Prevention Plan developed including recommendations for increased imaging surveillance (screening MMG with CT alternating with breast MRI q6m), lifestyle modifications and avoidance of hormone-based medications including OTC   Has consult scheduled at [facility]. 15 on January 13      Has seen Onc and asked to do another genetic testing or get results sent to Onc  Declines surgery      ROS:    Pt denies: Wt loss, Fever/Chills, HA, Visual changes, Fatigue, Chest pain, SOB, JASON, Abd pain, N/V/D/C, Blood in stool or urine, Edema. Pertinent positive as above in HPI. All others were negative    Patient Active Problem List   Diagnosis Code    Aneurysm of middle cerebral artery I67.1    Hyperglycemia due to type 2 diabetes mellitus (HCC) E11.65    Essential hypertension I10    Gastroesophageal reflux disease K21.9    Multiple thyroid nodules E04.2    Hyperlipidemia E78.5    Overweight (BMI 25.0-29. 9) E66.3    History of CVA (cerebrovascular accident) Z80.78    BRCA2 gene mutation negative Z13.71    Family history of breast cancer in sister Z80.2       Past Medical History:   Diagnosis Date    Environmental allergies     Headache(784.0)     hx migraines    HTN (hypertension) 9/23/2013    Hyperlipidemia 6/29/2018    Lacunar infarct, acute ([facility] [address] 2/17/16    admittted at [facility] 2/17-2/19    Menopause     Stroke [facility])     tia 9/10/13    Type II or unspecified type diabetes mellitus without mention of complication, not stated as uncontrolled 10/10/2013       Current Outpatient Medications   Medication Sig Dispense Refill    metFORMIN (GLUCOPHAGE) 1,000 mg tablet Take 1 Tablet by mouth two (2) times daily (with meals). 180 Tablet 2    losartan (COZAAR) 50 mg tablet Take 1 tablet by mouth once daily 90 Tablet 2    atorvastatin (LIPITOR) 10 mg tablet Take 1 tablet by mouth once daily 90 Tablet 2    benzonatate (TESSALON) 100 mg capsule Take 1 Cap by mouth three (3) times daily as needed for Cough. 30 Cap 1    ibuprofen (MOTRIN) 800 mg tablet Take 1 Tab by mouth every eight (8) hours as needed for Pain. 60 Tab 1    aspirin (ASPIRIN) 325 mg tablet Take 1 Tab by mouth daily. 90 Tab 1       Social History     Tobacco Use   Smoking Status Never Smoker   Smokeless Tobacco Never Used       Allergies   Allergen Reactions    Lisinopril Cough       Patient Labs were reviewed: yes    Patient Past Records were reviewed: yes      Objective:     Vitals:    02/10/22 1005   BP: 116/82   Pulse: 63   Resp: 16   Temp: 98.4 °F (36.9 °C)   TempSrc: Temporal   SpO2: 97%   Weight: 133 lb 8 oz (60.6 kg)   Height: 4' 11\" (1.499 m)     Body mass index is 26.96 kg/m². Exam:   Appearance: alert, well appearing,  oriented to person, place, and time, acyanotic, in no respiratory distress and well hydrated. HEENT:  NC/AT, pink conj, anicteric sclerae  Neck:  No cervical lymphadenopathy, no JVD, no thyromegaly, no carotid bruit  Heart:  RRR without M/R/G  Lungs:  CTAB, no rhonchi, rales, or wheezes with good air exchange   Abdomen:  Non-tender, pos bowel sounds, no hepatosplenomegaly  Ext:  No C/C/E    Skin: no rash  Neuro: no lateralizing signs, CNs II-XII intact            I have discussed the diagnosis with the patient and the intended plan as seen in the above orders. The patient has received an After-Visit Summary and questions were answered concerning future plans.      Medication Side Effects and Warnings were discussed with patient: yes    Patient verbalized understanding of above instructions.     Kieran Crain MD  Internal Medicine  [facility]

## 2022-02-10 NOTE — Clinical Note
Pls get last note/PAP and genetic testing results from gyn Dr Chandana Goins  Then we need to send genetic test results to [facility]-patient will e mail us the name of the Oncologist she saw

## 2022-02-11 LAB
ALBUMIN SERPL-MCNC: 4.5 G/DL (ref 3.8–4.9)
ALBUMIN/CREAT UR: 6 MG/G CREAT (ref 0–29)
ALBUMIN/GLOB SERPL: 1.6 {RATIO} (ref 1.2–2.2)
ALP SERPL-CCNC: 74 IU/L (ref 44–121)
ALT SERPL-CCNC: 26 IU/L (ref 0–32)
AST SERPL-CCNC: 20 IU/L (ref 0–40)
BASOPHILS # BLD AUTO: 0 X10E3/UL (ref 0–0.2)
BASOPHILS NFR BLD AUTO: 1 %
BILIRUB SERPL-MCNC: 0.4 MG/DL (ref 0–1.2)
BUN SERPL-MCNC: 9 MG/DL (ref 6–24)
BUN/CREAT SERPL: 15 (ref 9–23)
CALCIUM SERPL-MCNC: 9.4 MG/DL (ref 8.7–10.2)
CHLORIDE SERPL-SCNC: 103 MMOL/L (ref 96–106)
CHOLEST SERPL-MCNC: 143 MG/DL (ref 100–199)
CO2 SERPL-SCNC: 26 MMOL/L (ref 20–29)
CREAT SERPL-MCNC: 0.61 MG/DL (ref 0.57–1)
CREAT UR-MCNC: 121.3 MG/DL
EOSINOPHIL # BLD AUTO: 0.2 X10E3/UL (ref 0–0.4)
EOSINOPHIL NFR BLD AUTO: 2 %
ERYTHROCYTE [DISTWIDTH] IN BLOOD BY AUTOMATED COUNT: 12.2 % (ref 11.7–15.4)
EST. AVERAGE GLUCOSE BLD GHB EST-MCNC: 177 MG/DL
GLOBULIN SER CALC-MCNC: 2.8 G/DL (ref 1.5–4.5)
GLUCOSE SERPL-MCNC: 144 MG/DL (ref 65–99)
HBA1C MFR BLD: 7.8 % (ref 4.8–5.6)
HCT VFR BLD AUTO: 42.5 % (ref 34–46.6)
HDLC SERPL-MCNC: 48 MG/DL
HGB BLD-MCNC: 14.2 G/DL (ref 11.1–15.9)
IMM GRANULOCYTES # BLD AUTO: 0 X10E3/UL (ref 0–0.1)
IMM GRANULOCYTES NFR BLD AUTO: 0 %
IMP & REVIEW OF LAB RESULTS: NORMAL
LDLC SERPL CALC-MCNC: 71 MG/DL (ref 0–99)
LYMPHOCYTES # BLD AUTO: 2.7 X10E3/UL (ref 0.7–3.1)
LYMPHOCYTES NFR BLD AUTO: 40 %
MCH RBC QN AUTO: 30 PG (ref 26.6–33)
MCHC RBC AUTO-ENTMCNC: 33.4 G/DL (ref 31.5–35.7)
MCV RBC AUTO: 90 FL (ref 79–97)
MICROALBUMIN UR-MCNC: 7.4 UG/ML
MONOCYTES # BLD AUTO: 0.5 X10E3/UL (ref 0.1–0.9)
MONOCYTES NFR BLD AUTO: 7 %
NEUTROPHILS # BLD AUTO: 3.3 X10E3/UL (ref 1.4–7)
NEUTROPHILS NFR BLD AUTO: 50 %
PLATELET # BLD AUTO: 254 X10E3/UL (ref 150–450)
POTASSIUM SERPL-SCNC: 4.1 MMOL/L (ref 3.5–5.2)
PROT SERPL-MCNC: 7.3 G/DL (ref 6–8.5)
RBC # BLD AUTO: 4.73 X10E6/UL (ref 3.77–5.28)
SODIUM SERPL-SCNC: 143 MMOL/L (ref 134–144)
TRIGL SERPL-MCNC: 136 MG/DL (ref 0–149)
TSH SERPL DL<=0.005 MIU/L-ACNC: 0.96 UIU/ML (ref 0.45–4.5)
VLDLC SERPL CALC-MCNC: 24 MG/DL (ref 5–40)
WBC # BLD AUTO: 6.7 X10E3/UL (ref 3.4–10.8)

## 2022-02-16 ENCOUNTER — TELEPHONE (OUTPATIENT)
Dept: FAMILY MEDICINE CLINIC | Age: 57
End: 2022-02-16

## 2022-02-16 NOTE — TELEPHONE ENCOUNTER
Patient called and stated at her last visit that you and she discussed [place]. Patient states that it has not been sent to her pharmacy. [facility] .

## 2022-02-17 DIAGNOSIS — E11.65 TYPE 2 DIABETES MELLITUS WITH HYPERGLYCEMIA, WITHOUT LONG-TERM CURRENT USE OF INSULIN (HCC): Primary | ICD-10-CM

## 2022-03-19 PROBLEM — Z80.3 FAMILY HISTORY OF BREAST CANCER IN SISTER: Status: ACTIVE | Noted: 2022-02-10

## 2022-03-19 PROBLEM — E78.5 HYPERLIPIDEMIA: Status: ACTIVE | Noted: 2018-06-29

## 2022-03-19 PROBLEM — Z86.73 HISTORY OF CVA (CEREBROVASCULAR ACCIDENT): Status: ACTIVE | Noted: 2018-09-05

## 2022-03-19 PROBLEM — E66.3 OVERWEIGHT (BMI 25.0-29.9): Status: ACTIVE | Noted: 2018-09-05

## 2022-03-19 PROBLEM — Z13.71 BRCA2 GENE MUTATION NEGATIVE: Status: ACTIVE | Noted: 2022-02-10

## 2022-05-12 ENCOUNTER — VIRTUAL VISIT (OUTPATIENT)
Dept: FAMILY MEDICINE CLINIC | Age: 57
End: 2022-05-12
Payer: COMMERCIAL

## 2022-05-12 DIAGNOSIS — N39.46 MIXED STRESS AND URGE URINARY INCONTINENCE: ICD-10-CM

## 2022-05-12 DIAGNOSIS — J30.2 SEASONAL ALLERGIC RHINITIS, UNSPECIFIED TRIGGER: ICD-10-CM

## 2022-05-12 DIAGNOSIS — R80.9 TYPE 2 DIABETES MELLITUS WITH MICROALBUMINURIA, WITHOUT LONG-TERM CURRENT USE OF INSULIN (HCC): Primary | ICD-10-CM

## 2022-05-12 DIAGNOSIS — I10 ESSENTIAL HYPERTENSION: ICD-10-CM

## 2022-05-12 DIAGNOSIS — E11.29 TYPE 2 DIABETES MELLITUS WITH MICROALBUMINURIA, WITHOUT LONG-TERM CURRENT USE OF INSULIN (HCC): Primary | ICD-10-CM

## 2022-05-12 DIAGNOSIS — Z86.73 HISTORY OF CVA (CEREBROVASCULAR ACCIDENT): ICD-10-CM

## 2022-05-12 DIAGNOSIS — Z15.09 BRCA POSITIVE: ICD-10-CM

## 2022-05-12 DIAGNOSIS — Z15.01 BRCA POSITIVE: ICD-10-CM

## 2022-05-12 DIAGNOSIS — E78.5 HYPERLIPIDEMIA, UNSPECIFIED HYPERLIPIDEMIA TYPE: ICD-10-CM

## 2022-05-12 PROCEDURE — 99214 OFFICE O/P EST MOD 30 MIN: CPT | Performed by: INTERNAL MEDICINE

## 2022-05-12 PROCEDURE — 3052F HG A1C>EQUAL 8.0%<EQUAL 9.0%: CPT | Performed by: INTERNAL MEDICINE

## 2022-05-12 RX ORDER — DAPAGLIFLOZIN AND METFORMIN HYDROCHLORIDE 5; 1000 MG/1; MG/1
2 TABLET, FILM COATED, EXTENDED RELEASE ORAL
Qty: 60 TABLET | Refills: 5 | Status: SHIPPED | OUTPATIENT
Start: 2022-05-12 | End: 2022-08-10 | Stop reason: SDUPTHER

## 2022-05-12 RX ORDER — METFORMIN HYDROCHLORIDE 1000 MG/1
1000 TABLET ORAL 2 TIMES DAILY WITH MEALS
Qty: 180 TABLET | Refills: 2 | Status: CANCELLED | OUTPATIENT
Start: 2022-05-12

## 2022-05-12 RX ORDER — DAPAGLIFLOZIN AND METFORMIN HYDROCHLORIDE 5; 1000 MG/1; MG/1
2 TABLET, FILM COATED, EXTENDED RELEASE ORAL DAILY
Qty: 60 TABLET | Refills: 3 | Status: CANCELLED | OUTPATIENT
Start: 2022-05-12

## 2022-05-12 RX ORDER — LOSARTAN POTASSIUM 50 MG/1
TABLET ORAL
Qty: 90 TABLET | Refills: 2 | Status: SHIPPED | OUTPATIENT
Start: 2022-05-12 | End: 2022-08-10 | Stop reason: SDUPTHER

## 2022-05-12 RX ORDER — ATORVASTATIN CALCIUM 10 MG/1
TABLET, FILM COATED ORAL
Qty: 90 TABLET | Refills: 2 | Status: SHIPPED | OUTPATIENT
Start: 2022-05-12 | End: 2022-08-10 | Stop reason: SDUPTHER

## 2022-05-12 NOTE — PROGRESS NOTES
Anny Hinson is a 55 y.o. female who was seen by synchronous (real-time) audio-video technology on 5/12/2022 for Follow Up Chronic Condition and Allergic Rhinitis         Assessment & Plan:   Diagnoses and all orders for this visit:    1. Type 2 diabetes mellitus with microalbuminuria, without long-term current use of insulin (HCC) -tolerated Itz [place] so I will now combine her Metformin and Farxiga into 1 pill -we were able to help her get this approved by her insurance with $0 co pay  -     losartan (COZAAR) 50 mg tablet; Take 1 tablet by mouth once daily  -     HEMOGLOBIN A1C WITH EAG; Future  -     METABOLIC PANEL, COMPREHENSIVE; Future  -     dapagliflozin-metformin (Xigduo XR) 5-1,000 mg TBph; Take 2 Tablets by mouth daily (with breakfast). microalb prev positive-now on ARB, Farxiga    2. Essential hypertension-controlled, continue with current meds  -     losartan (COZAAR) 50 mg tablet; Take 1 tablet by mouth once daily  -     METABOLIC PANEL, COMPREHENSIVE; Future    3. Hyperlipidemia, unspecified hyperlipidemia type-goal LDL of less than 70 bec of DM AND HX OF CVA  -     atorvastatin (LIPITOR) 10 mg tablet; Take 1 tablet by mouth once daily    4. History of CVA (cerebrovascular accident)-continue with daily ASA    5. BRCA positive-will review notes from Onc, patient declines to have breast MRI bec of cost    6. Mixed stress and urge urinary incontinence-physical therapy and meds c/o Gyn did not work per patient, ready to have surgery as it is interfering with her ADLS including at work  -     REFERRAL TO FEMALE PELVIC MEDICINE AND RECONSTRUCTIVE SURGERY    7. Seasonal allergic rhinitis, unspecified trigger-continue with daily steroid nasal spray  -     montelukast (SINGULAIR) 10 mg tablet; Take 1 Tablet by mouth daily.  For allergies        Follow-up and Dispositions    · Return in about 3 months (around 8/12/2022) for follow up, fasting labs in AM.       712            Subjective:     Health Maintenance Due   Topic Date Due    DTaP/Tdap/Td series (1 - Tdap) Never done    Shingrix Vaccine Age 50> (1 of 2) Never done    Foot Exam Q1  02/11/2022    Pneumococcal 0-64 years (2 - PCV) 02/15/2022     Needs allergy meds-has nasal spray  Needs pills  Sneezing, throat itchy    Overactive bladder-did pelvic exercises already  Has to use pads daily bec even with walking  Has to stand at work all the time  Wants to do surgery  Tried pills before which did not help  Dr Wa[place]as [place] Onco-MRI and mammo but too expensive    Lab Results   Component Value Date/Time    Hemoglobin A1c 8.3 (H) 05/13/2022 08:56 AM    Hemoglobin A1c (POC) 6.9 11/09/2021 09:43 AM       Wt Readings from Last 3 Encounters:   02/10/22 133 lb 8 oz (60.6 kg)   11/09/21 131 lb 8 oz (59.6 kg)   08/13/21 135 lb 3.2 oz (61.3 kg)       BP Readings from Last 3 Encounters:   02/10/22 116/82   11/09/21 118/77   08/13/21 135/79     Wt Readings from Last 3 Encounters:   02/10/22 133 lb 8 oz (60.6 kg)   11/09/21 131 lb 8 oz (59.6 kg)   08/13/21 135 lb 3.2 oz (61.3 kg)     Lab Results   Component Value Date/Time    Cholesterol, total 143 02/10/2022 12:00 AM    HDL Cholesterol 48 02/10/2022 12:00 AM    LDL, calculated 71 02/10/2022 12:00 AM    LDL, calculated 65 02/13/2020 09:22 AM    VLDL, calculated 24 02/10/2022 12:00 AM    VLDL, calculated 28 02/13/2020 09:22 AM    Triglyceride 136 02/10/2022 12:00 AM     Prior to Admission medications    Medication Sig Start Date End Date Taking? Authorizing Provider   dapagliflozin Wright Dopp) 10 mg tab tablet Take 1 Tablet by mouth daily. 2/17/22  Yes Xiomara Rodríguez MD   metFORMIN (GLUCOPHAGE) 1,000 mg tablet Take 1 Tablet by mouth two (2) times daily (with meals).  2/10/22  Yes Devonte Salazar MD   losartan (COZAAR) 50 mg tablet Take 1 tablet by mouth once daily 2/10/22  Yes Devonte Salazar MD   naproxen (NAPROSYN) 500 mg tablet Take 1 Tablet by mouth two (2) times daily as needed (right sided neck pain). 2/10/22  Yes Xiomara Rodríguez MD   atorvastatin (LIPITOR) 10 mg tablet Take 1 tablet by mouth once daily 11/9/21  Yes Xiomara Rodríguez MD   benzonatate (TESSALON) 100 mg capsule Take 1 Cap by mouth three (3) times daily as needed for Cough. 2/11/21  Yes Xiomara Rodríguez MD   aspirin (ASPIRIN) 325 mg tablet Take 1 Tab by mouth daily. 5/21/19  Yes Harley Griffith MD     Patient Active Problem List    Diagnosis Date Noted    Mixed stress and urge urinary incontinence 05/15/2022    Seasonal allergic rhinitis 05/15/2022    BRCA2 gene mutation negative 02/10/2022    Family history of breast cancer in sister 02/10/2022    Overweight (BMI 25.0-29.9) 09/05/2018    History of CVA (cerebrovascular accident) 09/05/2018    Hyperlipidemia 06/29/2018    Multiple thyroid nodules 08/15/2016    Essential hypertension 08/11/2016    Gastroesophageal reflux disease 08/11/2016    Hyperglycemia due to type 2 diabetes mellitus ([facility] [address] 02/25/2016    Aneurysm of middle cerebral artery 09/23/2013       ROS    Objective:   No flowsheet data found. General: alert, cooperative, no distress   Mental  status: normal mood, behavior, speech, dress, motor activity, and thought processes, able to follow commands   HENT: NCAT   Neck: no visualized mass   Resp: no respiratory distress   Neuro: no gross deficits   Skin: no discoloration or lesions of concern on visible areas   Psychiatric: normal affect, consistent with stated mood, no evidence of hallucinations     Additional exam findings: We discussed the expected course, resolution and complications of the diagnosis(es) in detail. Medication risks, benefits, costs, interactions, and alternatives were discussed as indicated. I advised her to contact the office if her condition worsens, changes or fails to improve as anticipated. She expressed understanding with the diagnosis(es) and plan.      Marine Amaya, was evaluated through a synchronous (real-time) audio-video encounter. The patient (or guardian if applicable) is aware that this is a billable service, which includes applicable co-pays. Verbal consent to proceed has been obtained. The visit was conducted pursuant to the emergency declaration under the [address] [place] [address] authority and the [employer] General Act. Patient identification was verified, and a caregiver was present when appropriate. The patient was located at home in a state where the provider was licensed to provide care.     Melinda Chen MD

## 2022-05-12 NOTE — PROGRESS NOTES
Patient seen for routine visit with c/o sinus pressure. 1. Have you been to the ER, urgent care clinic since your last visit? Hospitalized since your last visit? No    2. Have you seen or consulted any other health care providers outside of the [address] [place] [address] since your last visit? Include any pap smears or colon screening.  No     Health Maintenance Due   Topic Date Due    DTaP/Tdap/Td series (1 - Tdap) Never done    Shingrix Vaccine Age 50> (1 of 2) Never done    Foot Exam Q1  02/11/2022    Pneumococcal 0-64 years (2 - PCV) 02/15/2022

## 2022-05-15 PROBLEM — J30.2 SEASONAL ALLERGIC RHINITIS: Status: ACTIVE | Noted: 2022-05-15

## 2022-05-15 PROBLEM — N39.46 MIXED STRESS AND URGE URINARY INCONTINENCE: Status: ACTIVE | Noted: 2022-05-15

## 2022-05-15 RX ORDER — MONTELUKAST SODIUM 10 MG/1
10 TABLET ORAL DAILY
Qty: 90 TABLET | Refills: 0 | Status: SHIPPED | OUTPATIENT
Start: 2022-05-15

## 2022-08-10 ENCOUNTER — OFFICE VISIT (OUTPATIENT)
Dept: FAMILY MEDICINE CLINIC | Age: 57
End: 2022-08-10
Payer: COMMERCIAL

## 2022-08-10 VITALS
HEIGHT: 59 IN | RESPIRATION RATE: 16 BRPM | BODY MASS INDEX: 26.57 KG/M2 | SYSTOLIC BLOOD PRESSURE: 110 MMHG | DIASTOLIC BLOOD PRESSURE: 80 MMHG | HEART RATE: 72 BPM | OXYGEN SATURATION: 99 % | TEMPERATURE: 98.2 F | WEIGHT: 131.8 LBS

## 2022-08-10 DIAGNOSIS — E11.29 TYPE 2 DIABETES MELLITUS WITH MICROALBUMINURIA, WITHOUT LONG-TERM CURRENT USE OF INSULIN (HCC): Primary | ICD-10-CM

## 2022-08-10 DIAGNOSIS — E78.5 HYPERLIPIDEMIA, UNSPECIFIED HYPERLIPIDEMIA TYPE: ICD-10-CM

## 2022-08-10 DIAGNOSIS — E04.2 MULTIPLE THYROID NODULES: ICD-10-CM

## 2022-08-10 DIAGNOSIS — I10 ESSENTIAL HYPERTENSION: ICD-10-CM

## 2022-08-10 DIAGNOSIS — R80.9 TYPE 2 DIABETES MELLITUS WITH MICROALBUMINURIA, WITHOUT LONG-TERM CURRENT USE OF INSULIN (HCC): Primary | ICD-10-CM

## 2022-08-10 PROCEDURE — 99214 OFFICE O/P EST MOD 30 MIN: CPT | Performed by: INTERNAL MEDICINE

## 2022-08-10 PROCEDURE — 3052F HG A1C>EQUAL 8.0%<EQUAL 9.0%: CPT | Performed by: INTERNAL MEDICINE

## 2022-08-10 RX ORDER — DAPAGLIFLOZIN AND METFORMIN HYDROCHLORIDE 5; 1000 MG/1; MG/1
2 TABLET, FILM COATED, EXTENDED RELEASE ORAL
Qty: 180 TABLET | Refills: 1 | Status: SHIPPED | OUTPATIENT
Start: 2022-08-10

## 2022-08-10 RX ORDER — ATORVASTATIN CALCIUM 10 MG/1
TABLET, FILM COATED ORAL
Qty: 90 TABLET | Refills: 2 | Status: SHIPPED | OUTPATIENT
Start: 2022-08-10

## 2022-08-10 RX ORDER — LOSARTAN POTASSIUM 50 MG/1
TABLET ORAL
Qty: 90 TABLET | Refills: 2 | Status: SHIPPED | OUTPATIENT
Start: 2022-08-10

## 2022-08-10 NOTE — PROGRESS NOTES
Assessment/ Plan:   Diagnoses and all orders for this visit:    1. Type 2 diabetes mellitus with microalbuminuria, without long-term current use of insulin (HCC)-tolerating max dose of Xigduo; goal A1c of 7  -     losartan (COZAAR) 50 mg tablet; Take 1 tablet by mouth once daily  -     dapagliflozin-metformin (Xigduo XR) 5-1,000 mg TBph; Take 2 Tablets by mouth daily (with breakfast). -     HEMOGLOBIN A1C WITH EAG; Future  -      DIABETES FOOT EXAM    2. Essential hypertension-controlled, continue with present meds  -     losartan (COZAAR) 50 mg tablet; Take 1 tablet by mouth once daily  -     CBC WITH AUTOMATED DIFF; Future  -     METABOLIC PANEL, COMPREHENSIVE; Future    3. Hyperlipidemia, unspecified hyperlipidemia type-goal LDL of less than 70  -     atorvastatin (LIPITOR) 10 mg tablet; Take 1 tablet by mouth once daily  -     LIPID PANEL; Future    4. Multiple thyroid nodules-declines to do follow up [facility] and because she says she has had this for a while and is not bothering her  Lab Results   Component Value Date/Time    TSH 0.961 02/10/2022 12:00 AM            Follow-up and Dispositions    Return in about 3 months (around 11/10/2022).                    Chief Complaint   Patient presents with    Follow Up Chronic Condition       Pt is a 55y.o. year old female who presents for follow up of her chronic medical problems    Health Maintenance Due   Topic Date Due    DTaP/Tdap/Td series (1 - Tdap) Never done    Shingrix Vaccine Age 50> (1 of 2) Never done    Foot Exam Q1  02/11/2022    Pneumococcal 0-64 years (2 - PCV) 02/15/2022    COVID-19 Vaccine (4 - Booster for Pfizer series) 02/28/2022      Wt Readings from Last 3 Encounters:   08/10/22 131 lb 12.8 oz (59.8 kg)   02/10/22 133 lb 8 oz (60.6 kg)   11/09/21 131 lb 8 oz (59.6 kg)        BP Readings from Last 3 Encounters:   08/10/22 110/80   02/10/22 116/82   11/09/21 118/77     Lab Results   Component Value Date/Time    Hemoglobin A1c 8.6 (H) 08/10/2022 12:53 PM    Hemoglobin A1c (POC) 6.9 11/09/2021 09:43 AM    On Xigduo since-no side effects except for UTI sxs    Declines to do thyroid US bec of co-pay    Had BRCA testing-told just to do yearly mammo          ROS:    Pt denies: Wt loss, Fever/Chills, HA, Visual changes, Fatigue, Chest pain, SOB, JASON, Abd pain, N/V/D/C, Blood in stool or urine, Edema. Pertinent positive as above in HPI. All others were negative    Patient Active Problem List   Diagnosis Code    Aneurysm of middle cerebral artery I67.1    Hyperglycemia due to type 2 diabetes mellitus ([facility] [address] E11.65    Essential hypertension I10    Gastroesophageal reflux disease K21.9    Multiple thyroid nodules E04.2    Hyperlipidemia E78.5    Overweight (BMI 25.0-29. 9) E66.3    History of CVA (cerebrovascular accident) Z86.73    BRCA2 gene mutation negative Z13.71    Family history of breast cancer in sister Z80.2    Mixed stress and urge urinary incontinence N39.46    Seasonal allergic rhinitis J30.2       Past Medical History:   Diagnosis Date    Environmental allergies     Headache(784.0)     hx migraines    HTN (hypertension) 9/23/2013    Hyperlipidemia 6/29/2018    Lacunar infarct, acute ([facility] [address] 2/17/16    [facility]mitt[facility] at [facility] 2/17-2/19    Menopause     Stroke ([facility]     tia 9/10/13    Type II or unspecified type diabetes mellitus without mention of complication, not stated as uncontrolled 10/10/2013       Current Outpatient Medications   Medication Sig Dispense Refill    montelukast (SINGULAIR) 10 mg tablet Take 1 Tablet by mouth daily. For allergies 90 Tablet 0    losartan (COZAAR) 50 mg tablet Take 1 tablet by mouth once daily 90 Tablet 2    atorvastatin (LIPITOR) 10 mg tablet Take 1 tablet by mouth once daily 90 Tablet 2    dapagliflozin-metformin (Xigduo XR) 5-1,000 mg TBph Take 2 Tablets by mouth daily (with breakfast). 60 Tablet 5    naproxen (NAPROSYN) 500 mg tablet Take 1 Tablet by mouth two (2) times daily as needed (right sided neck pain).  61 Tablet 0    benzonatate (TESSALON) 100 mg capsule Take 1 Cap by mouth three (3) times daily as needed for Cough. 30 Cap 1    aspirin (ASPIRIN) 325 mg tablet Take 1 Tab by mouth daily. 90 Tab 1       Social History     Tobacco Use   Smoking Status Never   Smokeless Tobacco Never       Allergies   Allergen Reactions    Lisinopril Cough       Patient Labs were reviewed: yes    Patient Past Records were reviewed: yes      Objective:     Vitals:    08/10/22 1200   BP: 110/80   Pulse: 72   Resp: 16   Temp: 98.2 °F (36.8 °C)   TempSrc: Temporal   SpO2: 99%   Weight: 131 lb 12.8 oz (59.8 kg)   Height: 4' 11\" (1.499 m)     Body mass index is 26.62 kg/m². Exam:   Appearance: alert, well appearing,  oriented to person, place, and time, acyanotic, in no respiratory distress and well hydrated. HEENT:  NC/AT, pink conj, anicteric sclerae  Neck:  No cervical lymphadenopathy, no JVD, no thyromegaly, no carotid bruit  Heart:  RRR without M/R/G  Lungs:  CTAB, no rhonchi, rales, or wheezes with good air exchange   Abdomen:  Non-tender, pos bowel sounds, no hepatosplenomegaly  Ext:  No C/C/E    Skin: no rash  Neuro: no lateralizing signs, CNs II-XII intact    Diabetic foot exam:     Left Foot:   Visual Exam: normal    Pulse DP: 2+ (normal)   Filament test: normal sensation    Vibratory sensation: normal      Right Foot:   Visual Exam: normal    Pulse DP: 2+ (normal)   Filament test: normal sensation    Vibratory sensation: normal           I have discussed the diagnosis with the patient and the intended plan as seen in the above orders. The patient has received an After-Visit Summary and questions were answered concerning future plans. Medication Side Effects and Warnings were discussed with patient: yes    Patient verbalized understanding of above instructions.     Martha Quintanilla MD  Internal Medicine  [facility]

## 2022-08-10 NOTE — PROGRESS NOTES
Patient seen for routine follow up     Health Maintenance Due   Topic Date Due    DTaP/Tdap/Td series (1 - Tdap) Never done    Shingrix Vaccine Age 50> (1 of 2) Never done    Foot Exam Q1  02/11/2022    Pneumococcal 0-64 years (2 - PCV) 02/15/2022    COVID-19 Vaccine (4 - Booster for Pfizer series) 02/28/2022     1. \"Have you been to the ER, urgent care clinic since your last visit? Hospitalized since your last visit? \" No    2. \"Have you seen or consulted any other health care providers outside of the [address] [place] [address] since your last visit? \" No     3. For patients aged 39-70: Has the patient had a colonoscopy / FIT/ Cologuard? Yes - no Care Gap present      If the patient is female:    4. For patients aged 41-77: Has the patient had a mammogram within the past 2 years? Yes - no Care Gap present      5. For patients aged 21-65: Has the patient had a pap smear?  Yes - no Care Gap present

## 2022-08-11 LAB
ALBUMIN SERPL-MCNC: 4.7 G/DL (ref 3.8–4.9)
ALBUMIN/GLOB SERPL: 1.6 {RATIO} (ref 1.2–2.2)
ALP SERPL-CCNC: 78 IU/L (ref 44–121)
ALT SERPL-CCNC: 32 IU/L (ref 0–32)
AST SERPL-CCNC: 28 IU/L (ref 0–40)
BASOPHILS # BLD AUTO: 0.1 X10E3/UL (ref 0–0.2)
BASOPHILS NFR BLD AUTO: 1 %
BILIRUB SERPL-MCNC: 0.3 MG/DL (ref 0–1.2)
BUN SERPL-MCNC: 13 MG/DL (ref 6–24)
BUN/CREAT SERPL: 22 (ref 9–23)
CALCIUM SERPL-MCNC: 9.5 MG/DL (ref 8.7–10.2)
CHLORIDE SERPL-SCNC: 102 MMOL/L (ref 96–106)
CHOLEST SERPL-MCNC: 152 MG/DL (ref 100–199)
CO2 SERPL-SCNC: 24 MMOL/L (ref 20–29)
CREAT SERPL-MCNC: 0.6 MG/DL (ref 0.57–1)
EGFR: 106 ML/MIN/1.73
EOSINOPHIL # BLD AUTO: 0.2 X10E3/UL (ref 0–0.4)
EOSINOPHIL NFR BLD AUTO: 2 %
ERYTHROCYTE [DISTWIDTH] IN BLOOD BY AUTOMATED COUNT: 12.6 % (ref 11.7–15.4)
EST. AVERAGE GLUCOSE BLD GHB EST-MCNC: 200 MG/DL
GLOBULIN SER CALC-MCNC: 3 G/DL (ref 1.5–4.5)
GLUCOSE SERPL-MCNC: 110 MG/DL (ref 65–99)
HBA1C MFR BLD: 8.6 % (ref 4.8–5.6)
HCT VFR BLD AUTO: 47.1 % (ref 34–46.6)
HDLC SERPL-MCNC: 44 MG/DL
HGB BLD-MCNC: 15.4 G/DL (ref 11.1–15.9)
IMM GRANULOCYTES # BLD AUTO: 0 X10E3/UL (ref 0–0.1)
IMM GRANULOCYTES NFR BLD AUTO: 0 %
IMP & REVIEW OF LAB RESULTS: NORMAL
LDLC SERPL CALC-MCNC: 90 MG/DL (ref 0–99)
LYMPHOCYTES # BLD AUTO: 2.7 X10E3/UL (ref 0.7–3.1)
LYMPHOCYTES NFR BLD AUTO: 34 %
MCH RBC QN AUTO: 29.1 PG (ref 26.6–33)
MCHC RBC AUTO-ENTMCNC: 32.7 G/DL (ref 31.5–35.7)
MCV RBC AUTO: 89 FL (ref 79–97)
MONOCYTES # BLD AUTO: 0.5 X10E3/UL (ref 0.1–0.9)
MONOCYTES NFR BLD AUTO: 7 %
NEUTROPHILS # BLD AUTO: 4.5 X10E3/UL (ref 1.4–7)
NEUTROPHILS NFR BLD AUTO: 56 %
PLATELET # BLD AUTO: 257 X10E3/UL (ref 150–450)
POTASSIUM SERPL-SCNC: 4.1 MMOL/L (ref 3.5–5.2)
PROT SERPL-MCNC: 7.7 G/DL (ref 6–8.5)
RBC # BLD AUTO: 5.3 X10E6/UL (ref 3.77–5.28)
SODIUM SERPL-SCNC: 140 MMOL/L (ref 134–144)
TRIGL SERPL-MCNC: 96 MG/DL (ref 0–149)
VLDLC SERPL CALC-MCNC: 18 MG/DL (ref 5–40)
WBC # BLD AUTO: 7.9 X10E3/UL (ref 3.4–10.8)

## 2022-08-15 ENCOUNTER — TELEPHONE (OUTPATIENT)
Dept: FAMILY MEDICINE CLINIC | Age: 57
End: 2022-08-15

## 2022-08-15 DIAGNOSIS — E11.29 TYPE 2 DIABETES MELLITUS WITH MICROALBUMINURIA, WITHOUT LONG-TERM CURRENT USE OF INSULIN (HCC): Primary | ICD-10-CM

## 2022-08-15 DIAGNOSIS — R80.9 TYPE 2 DIABETES MELLITUS WITH MICROALBUMINURIA, WITHOUT LONG-TERM CURRENT USE OF INSULIN (HCC): Primary | ICD-10-CM

## 2022-08-15 RX ORDER — GLIMEPIRIDE 1 MG/1
1 TABLET ORAL
Qty: 30 TABLET | Refills: 3 | Status: SHIPPED | OUTPATIENT
Start: 2022-08-15

## 2022-10-14 ENCOUNTER — HOSPITAL ENCOUNTER (OUTPATIENT)
Dept: WOMENS IMAGING | Age: 57
Discharge: HOME | End: 2022-10-14
Attending: INTERNAL MEDICINE
Payer: COMMERCIAL

## 2022-10-14 DIAGNOSIS — Z12.31 ENCOUNTER FOR SCREENING MAMMOGRAM FOR BREAST CANCER: ICD-10-CM

## 2022-10-14 PROCEDURE — 77063 BREAST TOMOSYNTHESIS BI: CPT

## 2022-11-10 ENCOUNTER — OFFICE VISIT (OUTPATIENT)
Dept: FAMILY MEDICINE CLINIC | Age: 57
End: 2022-11-10
Payer: COMMERCIAL

## 2022-11-10 VITALS
SYSTOLIC BLOOD PRESSURE: 108 MMHG | HEIGHT: 59 IN | TEMPERATURE: 98.2 F | RESPIRATION RATE: 16 BRPM | OXYGEN SATURATION: 97 % | HEART RATE: 63 BPM | WEIGHT: 129 LBS | BODY MASS INDEX: 26 KG/M2 | DIASTOLIC BLOOD PRESSURE: 67 MMHG

## 2022-11-10 DIAGNOSIS — R80.9 TYPE 2 DIABETES MELLITUS WITH MICROALBUMINURIA, WITHOUT LONG-TERM CURRENT USE OF INSULIN (HCC): ICD-10-CM

## 2022-11-10 DIAGNOSIS — J30.2 SEASONAL ALLERGIC RHINITIS, UNSPECIFIED TRIGGER: ICD-10-CM

## 2022-11-10 DIAGNOSIS — Z23 NEEDS FLU SHOT: ICD-10-CM

## 2022-11-10 DIAGNOSIS — E11.29 TYPE 2 DIABETES MELLITUS WITH MICROALBUMINURIA, WITHOUT LONG-TERM CURRENT USE OF INSULIN (HCC): ICD-10-CM

## 2022-11-10 DIAGNOSIS — I10 ESSENTIAL HYPERTENSION: ICD-10-CM

## 2022-11-10 DIAGNOSIS — E78.5 HYPERLIPIDEMIA, UNSPECIFIED HYPERLIPIDEMIA TYPE: ICD-10-CM

## 2022-11-10 DIAGNOSIS — Z00.00 WELL WOMAN EXAM (NO GYNECOLOGICAL EXAM): Primary | ICD-10-CM

## 2022-11-10 LAB — HBA1C MFR BLD HPLC: 7.6 %

## 2022-11-10 PROCEDURE — 90686 IIV4 VACC NO PRSV 0.5 ML IM: CPT | Performed by: INTERNAL MEDICINE

## 2022-11-10 PROCEDURE — 83036 HEMOGLOBIN GLYCOSYLATED A1C: CPT | Performed by: INTERNAL MEDICINE

## 2022-11-10 PROCEDURE — 90471 IMMUNIZATION ADMIN: CPT | Performed by: INTERNAL MEDICINE

## 2022-11-10 PROCEDURE — 3078F DIAST BP <80 MM HG: CPT | Performed by: INTERNAL MEDICINE

## 2022-11-10 PROCEDURE — 99396 PREV VISIT EST AGE 40-64: CPT | Performed by: INTERNAL MEDICINE

## 2022-11-10 PROCEDURE — 3074F SYST BP LT 130 MM HG: CPT | Performed by: INTERNAL MEDICINE

## 2022-11-10 RX ORDER — LOSARTAN POTASSIUM 50 MG/1
TABLET ORAL
Qty: 90 TABLET | Refills: 2 | Status: SHIPPED | OUTPATIENT
Start: 2022-11-10

## 2022-11-10 RX ORDER — BENZONATATE 100 MG/1
100 CAPSULE ORAL
Qty: 30 CAPSULE | Refills: 1 | Status: SHIPPED | OUTPATIENT
Start: 2022-11-10 | End: 2022-11-10 | Stop reason: SDUPTHER

## 2022-11-10 RX ORDER — ATORVASTATIN CALCIUM 10 MG/1
TABLET, FILM COATED ORAL
Qty: 90 TABLET | Refills: 2 | Status: SHIPPED | OUTPATIENT
Start: 2022-11-10

## 2022-11-10 RX ORDER — BENZONATATE 100 MG/1
100 CAPSULE ORAL
Qty: 30 CAPSULE | Refills: 2 | Status: SHIPPED | OUTPATIENT
Start: 2022-11-10

## 2022-11-10 RX ORDER — DAPAGLIFLOZIN AND METFORMIN HYDROCHLORIDE 5; 1000 MG/1; MG/1
2 TABLET, FILM COATED, EXTENDED RELEASE ORAL
Qty: 180 TABLET | Refills: 2 | Status: SHIPPED | OUTPATIENT
Start: 2022-11-10

## 2022-11-10 NOTE — PATIENT INSTRUCTIONS
Vaccine Information Statement    Influenza (Flu) Vaccine (Inactivated or Recombinant): What You Need to Know    Many vaccine information statements are available in [place] and other languages. See www.immunize.org/vis. Hojas de información sobre vacunas están disponibles en español y en muchos otros idiomas. Visite www.immunize.org/vis. 1. Why get vaccinated? Influenza vaccine can prevent influenza (flu). Flu is a contagious disease that spreads around the [facility] every year, usually between October and May. Anyone can get the flu, but it is more dangerous for some people. Infants and young children, people 72 years and older, pregnant people, and people with certain health conditions or a weakened immune system are at greatest risk of flu complications. Pneumonia, bronchitis, sinus infections, and ear infections are examples of flu-related complications. If you have a medical condition, such as heart disease, cancer, or diabetes, flu can make it worse. Flu can cause fever and chills, sore throat, muscle aches, fatigue, cough, headache, and runny or stuffy nose. Some people may have vomiting and diarrhea, though this is more common in children than adults. In an average year, thousands of people in the [facility] die from flu, and many more are hospitalized. Flu vaccine prevents millions of illnesses and flu-related visits to the doctor each year. 2. Influenza vaccines     CDC recommends everyone 6 months and older get vaccinated every flu season. Children 6 months through 6years of age may need 2 doses during a single flu season. Everyone else needs only 1 dose each flu season. It takes about 2 weeks for protection to develop after vaccination. There are many flu viruses, and they are always changing. Each year a new flu vaccine is made to protect against the influenza viruses believed to be likely to cause disease in the upcoming flu season.  Even when the vaccine doesnt exactly match these viruses, it may still provide some protection. Influenza vaccine does not cause flu. Influenza vaccine may be given at the same time as other vaccines. 3. Talk with your health care provider    Tell your vaccination provider if the person getting the vaccine:  Has had an allergic reaction after a previous dose of influenza vaccine, or has any severe, life-threatening allergies   Has ever had Guillain-Barré Syndrome (also called GBS)    In some cases, your health care provider may decide to postpone influenza vaccination until a future visit. Influenza vaccine can be administered at any time during pregnancy. People who are or will be pregnant during influenza season should receive inactivated influenza vaccine. People with minor illnesses, such as a cold, may be vaccinated. People who are moderately or severely ill should usually wait until they recover before getting influenza vaccine. Your health care provider can give you more information. 4. Risks of a vaccine reaction    Soreness, redness, and swelling where the shot is given, fever, muscle aches, and headache can happen after influenza vaccination. There may be a very small increased risk of Guillain-Barré Syndrome (GBS) after inactivated influenza vaccine (the flu shot). [facility] children who get the flu shot along with pneumococcal vaccine (PCV13) and/or DTaP vaccine at the same time might be slightly more likely to have a seizure caused by fever. Tell your health care provider if a child who is getting flu vaccine has ever had a seizure. People sometimes faint after medical procedures, including vaccination. Tell your provider if you feel dizzy or have vision changes or ringing in the ears. As with any medicine, there is a very remote chance of a vaccine causing a severe allergic reaction, other serious injury, or death. 5. What if there is a serious problem?     An allergic reaction could occur after the vaccinated person leaves the clinic. If you see signs of a severe allergic reaction (hives, swelling of the face and throat, difficulty breathing, a fast heartbeat, dizziness, or weakness), call 9-1-1 and get the person to the nearest hospital.    For other signs that concern you, call your health care provider. Adverse reactions should be reported to the Vaccine Adverse Event Reporting System (VAERS). Your health care provider will usually file this report, or you can do it yourself. Visit the VAERS website at www.vaers. [facility].gov or call 8-529.929.4136. VAERS is only for reporting reactions, and VAERS staff members do not give medical advice. 6. The National Vaccine Injury Compensation Program    The [facility] Vaccine Injury Compensation Program (VICP) is a federal program that was created to compensate people who may have been injured by certain vaccines. Claims regarding alleged injury or death due to vaccination have a time limit for filing, which may be as short as two years. Visit the VICP website at www.[facility].gov/vaccinecompensation or call 7-535.352.3088 to learn about the program and about filing a claim. 7. How can I learn more? Ask your health care provider. Call your local or state health department. Visit the website of the Food and Drug Administration (FDA) for vaccine package inserts and additional information at www.fda.gov/vaccines-blood-biologics/vaccines. Contact the Centers for Disease Control and Prevention (CDC): Call 5-121.648.6310 (1-800-CDC-INFO) or  Visit CDCs influenza website at www.cdc.gov/flu. Vaccine Information Statement   Inactivated Influenza Vaccine   8/6/2021  42 BERNICE Sethi [id number]   Department of Health and Human Services  Centers for Disease Control and Prevention    Office Use Only

## 2022-11-10 NOTE — PROGRESS NOTES
1. \"Have you been to the ER, urgent care clinic since your last visit? Hospitalized since your last visit? \" Yes Reason for visit: Riccardo Maxwell on face August 2022 [facility]    2. \"Have you seen or consulted any other health care providers outside of the [address] [place] [address] since your last visit? \" No     3. For patients aged 39-70: Has the patient had a colonoscopy / FIT/ Cologuard? Yes - no Care Gap present      If the patient is female:    4. For patients aged 41-77: Has the patient had a mammogram within the past 2 years? Yes - no Care Gap present      5. For patients aged 21-65: Has the patient had a pap smear? Yes - no Care Gap present    Patient was given VIS for review, consent was obtained and per orders of Dr. Tom Whittington, injection of Flulaval given by Lary Jamison LPN. Patient observed. No signs nor symptoms of any adverse reactions. Patient tolerated injection well.

## 2023-02-10 DIAGNOSIS — H92.03 OTALGIA OF BOTH EARS: Primary | ICD-10-CM

## 2023-06-07 ENCOUNTER — TELEPHONE (OUTPATIENT)
Facility: CLINIC | Age: 58
End: 2023-06-07

## 2023-06-07 NOTE — TELEPHONE ENCOUNTER
Pt has a bad canker sore under her tongue that has been swelling. States shes taken otc medication but nothing seems to help. She would like to know if there is any antibiotic that can be sent in for her. Please advise.

## 2023-06-13 NOTE — TELEPHONE ENCOUNTER
Returned call to patient. No Answer. Left message informing per Dr. Giacomo Candelaria she would not send an antibiotic for canker sore. If she is still bother by canker sore she will need to see patient this week. Requested patient return call or send me a my chart message.

## 2023-07-17 RX ORDER — BENZONATATE 100 MG/1
100 CAPSULE ORAL 3 TIMES DAILY PRN
Qty: 30 CAPSULE | Refills: 1 | Status: SHIPPED | OUTPATIENT
Start: 2023-07-17

## 2023-08-17 ENCOUNTER — OFFICE VISIT (OUTPATIENT)
Facility: CLINIC | Age: 58
End: 2023-08-17
Payer: COMMERCIAL

## 2023-08-17 VITALS
TEMPERATURE: 98.2 F | BODY MASS INDEX: 26.81 KG/M2 | OXYGEN SATURATION: 100 % | HEIGHT: 59 IN | HEART RATE: 60 BPM | DIASTOLIC BLOOD PRESSURE: 77 MMHG | SYSTOLIC BLOOD PRESSURE: 109 MMHG | WEIGHT: 133 LBS | RESPIRATION RATE: 14 BRPM

## 2023-08-17 DIAGNOSIS — I10 ESSENTIAL (PRIMARY) HYPERTENSION: ICD-10-CM

## 2023-08-17 DIAGNOSIS — E11.29 TYPE 2 DIABETES MELLITUS WITH OTHER DIABETIC KIDNEY COMPLICATION (HCC): Primary | ICD-10-CM

## 2023-08-17 DIAGNOSIS — E04.2 NONTOXIC MULTINODULAR GOITER: ICD-10-CM

## 2023-08-17 DIAGNOSIS — Z11.4 SCREENING FOR HUMAN IMMUNODEFICIENCY VIRUS: ICD-10-CM

## 2023-08-17 DIAGNOSIS — Z23 NEED FOR TDAP VACCINATION: ICD-10-CM

## 2023-08-17 DIAGNOSIS — E78.5 HYPERLIPIDEMIA, UNSPECIFIED HYPERLIPIDEMIA TYPE: ICD-10-CM

## 2023-08-17 PROCEDURE — 3078F DIAST BP <80 MM HG: CPT | Performed by: INTERNAL MEDICINE

## 2023-08-17 PROCEDURE — 99214 OFFICE O/P EST MOD 30 MIN: CPT | Performed by: INTERNAL MEDICINE

## 2023-08-17 PROCEDURE — 90715 TDAP VACCINE 7 YRS/> IM: CPT | Performed by: INTERNAL MEDICINE

## 2023-08-17 PROCEDURE — 3052F HG A1C>EQUAL 8.0%<EQUAL 9.0%: CPT | Performed by: INTERNAL MEDICINE

## 2023-08-17 PROCEDURE — 3074F SYST BP LT 130 MM HG: CPT | Performed by: INTERNAL MEDICINE

## 2023-08-17 PROCEDURE — 90471 IMMUNIZATION ADMIN: CPT | Performed by: INTERNAL MEDICINE

## 2023-08-17 SDOH — ECONOMIC STABILITY: HOUSING INSECURITY
IN THE LAST 12 MONTHS, WAS THERE A TIME WHEN YOU DID NOT HAVE A STEADY PLACE TO SLEEP OR SLEPT IN A SHELTER (INCLUDING NOW)?: NO

## 2023-08-17 SDOH — ECONOMIC STABILITY: FOOD INSECURITY: WITHIN THE PAST 12 MONTHS, YOU WORRIED THAT YOUR FOOD WOULD RUN OUT BEFORE YOU GOT MONEY TO BUY MORE.: NEVER TRUE

## 2023-08-17 SDOH — ECONOMIC STABILITY: INCOME INSECURITY: HOW HARD IS IT FOR YOU TO PAY FOR THE VERY BASICS LIKE FOOD, HOUSING, MEDICAL CARE, AND HEATING?: NOT VERY HARD

## 2023-08-17 SDOH — ECONOMIC STABILITY: FOOD INSECURITY: WITHIN THE PAST 12 MONTHS, THE FOOD YOU BOUGHT JUST DIDN'T LAST AND YOU DIDN'T HAVE MONEY TO GET MORE.: NEVER TRUE

## 2023-08-17 ASSESSMENT — PATIENT HEALTH QUESTIONNAIRE - PHQ9
SUM OF ALL RESPONSES TO PHQ9 QUESTIONS 1 & 2: 0
SUM OF ALL RESPONSES TO PHQ QUESTIONS 1-9: 0
1. LITTLE INTEREST OR PLEASURE IN DOING THINGS: 0
2. FEELING DOWN, DEPRESSED OR HOPELESS: 0

## 2023-08-17 NOTE — PROGRESS NOTES
1. \"Have you been to the ER, urgent care clinic since your last visit? Hospitalized since your last visit? \" No    2. \"Have you seen or consulted any other health care providers outside of the [address] [place] [address] since your last visit? \" No    3. For patients aged 43-73: Has the patient had a colonoscopy / FIT/ Cologuard? YesYes - no Care Gap present      If the patient is female:    4. For patients aged 43-66: Has the patient had a mammogram within the past 2 years? Yes - no Care Gap present    5. For patients aged 21-65: Has the patient had a pap smear? Yes - no Care Gap present    Health Maintenance Due   Topic Date Due    HIV screen  Never done    Hepatitis B vaccine (1 of 3 - Risk 3-dose series) Never done    DTaP/Tdap/Td vaccine (1 - Tdap) Never done    Shingles vaccine (1 of 2) Never done    COVID-19 Vaccine (4 - Booster for Pfizer series) 12/24/2021    Diabetic retinal exam  10/14/2022    Diabetic Alb to Cr ratio (uACR) test  02/10/2023    Flu vaccine (1) 08/01/2023    Diabetic foot exam  08/10/2023    Lipids  08/10/2023    Depression Screen  08/10/2023    GFR test (Diabetes, CKD 3-4, OR last GFR 15-59)  08/10/2023   Patient was given VIS for review, consent was obtained and per orders of Dr. Maximus Dorado, injection of Tdap given by Danis Pino N. Patient observed. No signs nor symptoms of any adverse reactions. Patient tolerated injection well.

## 2023-08-17 NOTE — PROGRESS NOTES
Assessment/ Plan:   Roxy was seen today for follow-up chronic condition. Diagnoses and all orders for this visit:    Type 2 diabetes mellitus with other diabetic kidney complication (HCC)-will adjust meds once labs are back  -     Microalbumin / Creatinine Urine Ratio; Future  -     Hemoglobin A1C; Future    Screening for human immunodeficiency virus  -     HIV 1/2 Ag/Ab, 4TH Generation,W Rflx Confirm; Future    Essential (primary) hypertension-controlled, continue with ARB  -     CBC; Future  -     Comprehensive Metabolic Panel; Future    Hyperlipidemia, unspecified hyperlipidemia type-goal LDL of less than 70 on Lipitor  -     Lipid Panel; Future    Nontoxic multinodular goiter-still declining to have follow up [address] of high co pay she says  -     TSH; Future    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                      Chief Complaint   Patient presents with    Follow-up Chronic Condition       Pt is a 65y.o. year old female who presents for follow up of her chronic medical problems    Health Maintenance Due   Topic Date Due    HIV screen -today Never done    Hepatitis B vaccine (1 of 3 - Risk 3-dose series) Never done    DTaP/Tdap/Td vaccine (1 - Tdap)-today Never done    Shingles vaccine (1 of 2) Never done    COVID-19 Vaccine (4 - Booster for Pfizer series) 12/24/2021    Diabetic retinal exam -had eye exam recently, Dr Will Simmons retinopathy 10/14/2022    Diabetic Alb to Cr ratio (uACR) test -today 02/10/2023    Flu vaccine (1) 08/01/2023    Diabetic foot exam  08/10/2023    Lipids -today 08/10/2023    Depression Screen  08/10/2023    GFR test (Diabetes, CKD 3-4, OR last GFR 15-59) -today 08/10/2023    PAP c/o Dr Orquidea Mcgill positive      Wt Readings from Last 3 Encounters:   08/17/23 133 lb

## 2023-08-18 LAB
ALBUMIN SERPL-MCNC: 4.6 G/DL (ref 3.8–4.9)
ALBUMIN/CREAT UR: <5 MG/G CREAT (ref 0–29)
ALBUMIN/GLOB SERPL: 1.4 {RATIO} (ref 1.2–2.2)
ALP SERPL-CCNC: 84 IU/L (ref 44–121)
ALT SERPL-CCNC: 35 IU/L (ref 0–32)
AST SERPL-CCNC: 26 IU/L (ref 0–40)
BILIRUB SERPL-MCNC: 0.6 MG/DL (ref 0–1.2)
BUN SERPL-MCNC: 13 MG/DL (ref 6–24)
BUN/CREAT SERPL: 18 (ref 9–23)
CALCIUM SERPL-MCNC: 10.1 MG/DL (ref 8.7–10.2)
CHLORIDE SERPL-SCNC: 99 MMOL/L (ref 96–106)
CHOLEST SERPL-MCNC: 183 MG/DL (ref 100–199)
CO2 SERPL-SCNC: 24 MMOL/L (ref 20–29)
CREAT SERPL-MCNC: 0.74 MG/DL (ref 0.57–1)
CREAT UR-MCNC: 61.7 MG/DL
EGFRCR SERPLBLD CKD-EPI 2021: 95 ML/MIN/1.73
ERYTHROCYTE [DISTWIDTH] IN BLOOD BY AUTOMATED COUNT: 12.3 % (ref 11.7–15.4)
GLOBULIN SER CALC-MCNC: 3.4 G/DL (ref 1.5–4.5)
GLUCOSE SERPL-MCNC: 151 MG/DL (ref 70–99)
HBA1C MFR BLD: 8.2 % (ref 4.8–5.6)
HCT VFR BLD AUTO: 46.1 % (ref 34–46.6)
HDLC SERPL-MCNC: 48 MG/DL
HGB BLD-MCNC: 15.8 G/DL (ref 11.1–15.9)
HIV 1+2 AB+HIV1 P24 AG SERPL QL IA: NON REACTIVE
LDLC SERPL CALC-MCNC: 108 MG/DL (ref 0–99)
MCH RBC QN AUTO: 30.5 PG (ref 26.6–33)
MCHC RBC AUTO-ENTMCNC: 34.3 G/DL (ref 31.5–35.7)
MCV RBC AUTO: 89 FL (ref 79–97)
MICROALBUMIN UR-MCNC: <3 UG/ML
PLATELET # BLD AUTO: 259 X10E3/UL (ref 150–450)
POTASSIUM SERPL-SCNC: 4.7 MMOL/L (ref 3.5–5.2)
PROT SERPL-MCNC: 8 G/DL (ref 6–8.5)
RBC # BLD AUTO: 5.18 X10E6/UL (ref 3.77–5.28)
SODIUM SERPL-SCNC: 141 MMOL/L (ref 134–144)
TRIGL SERPL-MCNC: 155 MG/DL (ref 0–149)
TSH SERPL DL<=0.005 MIU/L-ACNC: 0.87 UIU/ML (ref 0.45–4.5)
VLDLC SERPL CALC-MCNC: 27 MG/DL (ref 5–40)
WBC # BLD AUTO: 7.5 X10E3/UL (ref 3.4–10.8)

## 2023-08-21 RX ORDER — LOSARTAN POTASSIUM 50 MG/1
50 TABLET ORAL DAILY
Qty: 90 TABLET | Refills: 1 | Status: SHIPPED | OUTPATIENT
Start: 2023-08-21

## 2023-08-21 RX ORDER — ATORVASTATIN CALCIUM 10 MG/1
10 TABLET, FILM COATED ORAL DAILY
Qty: 90 TABLET | Refills: 1 | Status: SHIPPED | OUTPATIENT
Start: 2023-08-21

## 2023-08-21 NOTE — TELEPHONE ENCOUNTER
[facility] Clinical Staff  Subject: Refill Request     QUESTIONS   Name of Medication? atorvastatin (LIPITOR) 10 MG tablet   Patient-reported dosage and instructions? 10 mg 1x a day   How many days do you have left? 30   Preferred Pharmacy? [address] [facility]   Pharmacy phone number (if available)? [phone]   ---------------------------------------------------------------------------   --------------,   Name of Medication? losartan (COZAAR) 50 MG tablet   Patient-reported dosage and instructions? 50 mg 1x a day   How many days do you have left? 30   Preferred Pharmacy? [address]   Pharmacy phone number (if available)? [phone]   ---------------------------------------------------------------------------   --------------   CALL BACK INFO   What is the best way for the office to contact you? OK to leave message on   voicemail   Preferred Call Back Phone Number? [phone]   ---------------------------------------------------------------------------   --------------   SCRIPT ANSWERS   Relationship to Patient?  Self

## 2023-10-17 ENCOUNTER — HOSPITAL ENCOUNTER (OUTPATIENT)
Dept: WOMENS IMAGING | Facility: HOSPITAL | Age: 58
Discharge: HOME | End: 2023-10-20
Attending: INTERNAL MEDICINE
Payer: COMMERCIAL

## 2023-10-17 DIAGNOSIS — Z12.31 VISIT FOR SCREENING MAMMOGRAM: ICD-10-CM

## 2023-10-17 PROCEDURE — 77063 BREAST TOMOSYNTHESIS BI: CPT

## 2023-11-13 RX ORDER — DAPAGLIFLOZIN AND METFORMIN HYDROCHLORIDE 5; 1000 MG/1; MG/1
TABLET, FILM COATED, EXTENDED RELEASE ORAL
Qty: 180 TABLET | Refills: 0 | Status: SHIPPED | OUTPATIENT
Start: 2023-11-13

## 2024-02-07 RX ORDER — DAPAGLIFLOZIN AND METFORMIN HYDROCHLORIDE 5; 1000 MG/1; MG/1
TABLET, FILM COATED, EXTENDED RELEASE ORAL
Qty: 180 TABLET | Refills: 0 | OUTPATIENT
Start: 2024-02-07

## 2024-02-12 ENCOUNTER — TELEPHONE (OUTPATIENT)
Facility: CLINIC | Age: 59
End: 2024-02-12

## 2024-02-12 NOTE — TELEPHONE ENCOUNTER
Patient called for refill on her medication. She needs a 6 month follow up buy she stated she does not have any insurance.  She is requesting Metformin in place of the Xigduo XR because she can afford it.   She also requested that all her medications be sent to [facility] - I told  her she needs an appointment so she inquired as to cost and I told her a minimum of $190.  She did not make the appointment.    Call back - [phone].

## 2024-02-13 DIAGNOSIS — E11.29 TYPE 2 DIABETES MELLITUS WITH OTHER DIABETIC KIDNEY COMPLICATION (HCC): Primary | ICD-10-CM

## 2024-05-07 RX ORDER — LOSARTAN POTASSIUM 50 MG/1
50 TABLET ORAL DAILY
Qty: 90 TABLET | Refills: 0 | Status: SHIPPED | OUTPATIENT
Start: 2024-05-07

## 2024-05-07 RX ORDER — ATORVASTATIN CALCIUM 10 MG/1
10 TABLET, FILM COATED ORAL DAILY
Qty: 90 TABLET | Refills: 0 | Status: SHIPPED | OUTPATIENT
Start: 2024-05-07

## 2024-07-10 ENCOUNTER — LAB (OUTPATIENT)
Facility: CLINIC | Age: 59
End: 2024-07-10

## 2024-07-10 DIAGNOSIS — Z00.00 WELL WOMAN EXAM (NO GYNECOLOGICAL EXAM): Primary | ICD-10-CM

## 2024-07-10 DIAGNOSIS — E11.29 TYPE 2 DIABETES MELLITUS WITH OTHER DIABETIC KIDNEY COMPLICATION (HCC): ICD-10-CM

## 2024-07-10 DIAGNOSIS — E04.2 NONTOXIC MULTINODULAR GOITER: ICD-10-CM

## 2024-07-11 LAB
ALBUMIN SERPL-MCNC: 4.6 G/DL (ref 3.8–4.9)
ALBUMIN/CREAT UR: 14 MG/G CREAT (ref 0–29)
ALP SERPL-CCNC: 98 IU/L (ref 44–121)
ALT SERPL-CCNC: 29 IU/L (ref 0–32)
AST SERPL-CCNC: 24 IU/L (ref 0–40)
BILIRUB SERPL-MCNC: 0.5 MG/DL (ref 0–1.2)
BUN SERPL-MCNC: 13 MG/DL (ref 6–24)
BUN/CREAT SERPL: 22 (ref 9–23)
CALCIUM SERPL-MCNC: 9.7 MG/DL (ref 8.7–10.2)
CHLORIDE SERPL-SCNC: 101 MMOL/L (ref 96–106)
CHOLEST SERPL-MCNC: 176 MG/DL (ref 100–199)
CO2 SERPL-SCNC: 23 MMOL/L (ref 20–29)
CREAT SERPL-MCNC: 0.59 MG/DL (ref 0.57–1)
CREAT UR-MCNC: 70.9 MG/DL
EGFRCR SERPLBLD CKD-EPI 2021: 105 ML/MIN/1.73
ERYTHROCYTE [DISTWIDTH] IN BLOOD BY AUTOMATED COUNT: 12 % (ref 11.7–15.4)
GLOBULIN SER CALC-MCNC: 3 G/DL (ref 1.5–4.5)
GLUCOSE SERPL-MCNC: 188 MG/DL (ref 70–99)
HBA1C MFR BLD: 10 % (ref 4.8–5.6)
HCT VFR BLD AUTO: 47.7 % (ref 34–46.6)
HDLC SERPL-MCNC: 48 MG/DL
HGB BLD-MCNC: 15.8 G/DL (ref 11.1–15.9)
LDLC SERPL CALC-MCNC: 102 MG/DL (ref 0–99)
MCH RBC QN AUTO: 30.9 PG (ref 26.6–33)
MCHC RBC AUTO-ENTMCNC: 33.1 G/DL (ref 31.5–35.7)
MCV RBC AUTO: 93 FL (ref 79–97)
MICROALBUMIN UR-MCNC: 9.6 UG/ML
PLATELET # BLD AUTO: 251 X10E3/UL (ref 150–450)
POTASSIUM SERPL-SCNC: 4 MMOL/L (ref 3.5–5.2)
PROT SERPL-MCNC: 7.6 G/DL (ref 6–8.5)
RBC # BLD AUTO: 5.11 X10E6/UL (ref 3.77–5.28)
SODIUM SERPL-SCNC: 140 MMOL/L (ref 134–144)
TRIGL SERPL-MCNC: 149 MG/DL (ref 0–149)
TSH SERPL DL<=0.005 MIU/L-ACNC: 0.83 UIU/ML (ref 0.45–4.5)
VLDLC SERPL CALC-MCNC: 26 MG/DL (ref 5–40)
WBC # BLD AUTO: 7 X10E3/UL (ref 3.4–10.8)

## 2024-07-16 ENCOUNTER — OFFICE VISIT (OUTPATIENT)
Facility: CLINIC | Age: 59
End: 2024-07-16
Payer: COMMERCIAL

## 2024-07-16 VITALS
SYSTOLIC BLOOD PRESSURE: 111 MMHG | HEART RATE: 71 BPM | TEMPERATURE: 98.3 F | DIASTOLIC BLOOD PRESSURE: 75 MMHG | BODY MASS INDEX: 27.13 KG/M2 | RESPIRATION RATE: 16 BRPM | OXYGEN SATURATION: 96 % | HEIGHT: 59 IN | WEIGHT: 134.6 LBS

## 2024-07-16 DIAGNOSIS — I10 ESSENTIAL (PRIMARY) HYPERTENSION: ICD-10-CM

## 2024-07-16 DIAGNOSIS — E11.29 TYPE 2 DIABETES MELLITUS WITH OTHER DIABETIC KIDNEY COMPLICATION (HCC): ICD-10-CM

## 2024-07-16 DIAGNOSIS — E78.5 HYPERLIPIDEMIA, UNSPECIFIED HYPERLIPIDEMIA TYPE: ICD-10-CM

## 2024-07-16 DIAGNOSIS — E04.2 NONTOXIC MULTINODULAR GOITER: ICD-10-CM

## 2024-07-16 DIAGNOSIS — Z00.00 WELL WOMAN EXAM (NO GYNECOLOGICAL EXAM): Primary | ICD-10-CM

## 2024-07-16 PROCEDURE — 3078F DIAST BP <80 MM HG: CPT | Performed by: INTERNAL MEDICINE

## 2024-07-16 PROCEDURE — 3074F SYST BP LT 130 MM HG: CPT | Performed by: INTERNAL MEDICINE

## 2024-07-16 PROCEDURE — 99396 PREV VISIT EST AGE 40-64: CPT | Performed by: INTERNAL MEDICINE

## 2024-07-16 RX ORDER — BENZONATATE 100 MG/1
100 CAPSULE ORAL 3 TIMES DAILY PRN
Qty: 30 CAPSULE | Refills: 2 | Status: SHIPPED | OUTPATIENT
Start: 2024-07-16

## 2024-07-16 RX ORDER — DAPAGLIFLOZIN AND METFORMIN HYDROCHLORIDE 5; 1000 MG/1; MG/1
TABLET, FILM COATED, EXTENDED RELEASE ORAL
Qty: 180 TABLET | Refills: 1 | Status: SHIPPED | OUTPATIENT
Start: 2024-07-16

## 2024-07-16 RX ORDER — NAPROXEN 500 MG/1
500 TABLET ORAL 2 TIMES DAILY PRN
Qty: 60 TABLET | Refills: 3 | Status: SHIPPED | OUTPATIENT
Start: 2024-07-16

## 2024-07-16 RX ORDER — ATORVASTATIN CALCIUM 10 MG/1
10 TABLET, FILM COATED ORAL DAILY
Qty: 90 TABLET | Refills: 1 | Status: SHIPPED | OUTPATIENT
Start: 2024-07-16

## 2024-07-16 RX ORDER — LOSARTAN POTASSIUM 50 MG/1
50 TABLET ORAL DAILY
Qty: 90 TABLET | Refills: 1 | Status: SHIPPED | OUTPATIENT
Start: 2024-07-16

## 2024-07-16 ASSESSMENT — PATIENT HEALTH QUESTIONNAIRE - PHQ9
SUM OF ALL RESPONSES TO PHQ QUESTIONS 1-9: 0
2. FEELING DOWN, DEPRESSED OR HOPELESS: NOT AT ALL
SUM OF ALL RESPONSES TO PHQ QUESTIONS 1-9: 0
SUM OF ALL RESPONSES TO PHQ QUESTIONS 1-9: 0
1. LITTLE INTEREST OR PLEASURE IN DOING THINGS: NOT AT ALL
SUM OF ALL RESPONSES TO PHQ9 QUESTIONS 1 & 2: 0
SUM OF ALL RESPONSES TO PHQ QUESTIONS 1-9: 0

## 2024-07-16 NOTE — PROGRESS NOTES
\"Have you been to the ER, urgent care clinic since your last visit?  Hospitalized since your last visit?\"    NO    “Have you seen or consulted any other health care providers outside of [facility] since your last visit?”    NO            Click Here for Release of Records Request    
  Medication Sig Dispense Refill    atorvastatin (LIPITOR) 10 MG tablet Take 1 tablet by mouth once daily 90 tablet 0    losartan (COZAAR) 50 MG tablet Take 1 tablet by mouth once daily 90 tablet 0    XIGDUO XR 5-1000 MG TB24 TAKE 2 TABLETS BY MOUTH ONCE DAILY WITH BREAKFAST 180 tablet 0    benzonatate (TESSALON) 100 MG capsule Take 1 capsule by mouth 3 times daily as needed for Cough 30 capsule 1    aspirin 325 MG tablet Take 1 tablet by mouth daily      montelukast (SINGULAIR) 10 MG tablet Take 1 tablet by mouth daily As needed      naproxen (NAPROSYN) 500 MG tablet Take 1 tablet by mouth 2 times daily as needed      metFORMIN (GLUCOPHAGE) 1000 MG tablet Take 1 tablet by mouth 2 times daily (with meals) (Patient not taking: Reported on 7/16/2024) 180 tablet 0     No current facility-administered medications for this visit.       Social History     Tobacco Use   Smoking Status Never   Smokeless Tobacco Never       Allergies   Allergen Reactions    Lisinopril Cough       Patient Labs were reviewed: yes    Patient Past Records were reviewed: yes      Objective:     Vitals:    07/16/24 1319 07/16/24 1327 07/16/24 1329   BP: 113/80 115/76 111/75   Site: Left Upper Arm Left Upper Arm Left Upper Arm   Position: Sitting Supine Standing   Cuff Size: Medium Adult Medium Adult Medium Adult   Pulse: 71 63 71   Resp: 16     Temp: 98.3 °F (36.8 °C)     TempSrc: Temporal     SpO2: 94% 96% 96%   Weight: 61.1 kg (134 lb 9.6 oz)     Height: 1.499 m (4' 11\")       Body mass index is 27.19 kg/m².    Exam:   Appearance: alert, well appearing,  oriented to person, place, and time, acyanotic, in no respiratory distress and well hydrated.  HEENT:  NC/AT, pink conj, anicteric sclerae  Neck:  No cervical lymphadenopathy, no JVD, no thyromegaly, no carotid bruit  Heart:  RRR without M/R/G  Lungs:  CTAB, no rhonchi, rales, or wheezes with good air exchange   Abdomen:  Non-tender, pos bowel sounds, no hepatosplenomegaly  Ext:  No C/C/E

## 2024-08-20 DIAGNOSIS — E04.1 THYROID NODULE: Primary | ICD-10-CM

## 2024-09-04 ENCOUNTER — OFFICE VISIT (OUTPATIENT)
Facility: CLINIC | Age: 59
End: 2024-09-04
Payer: MEDICAID

## 2024-09-04 VITALS
SYSTOLIC BLOOD PRESSURE: 113 MMHG | BODY MASS INDEX: 26.89 KG/M2 | WEIGHT: 133.4 LBS | HEART RATE: 65 BPM | TEMPERATURE: 98.4 F | OXYGEN SATURATION: 98 % | DIASTOLIC BLOOD PRESSURE: 73 MMHG | HEIGHT: 59 IN | RESPIRATION RATE: 16 BRPM

## 2024-09-04 DIAGNOSIS — E04.2 NONTOXIC MULTINODULAR GOITER: ICD-10-CM

## 2024-09-04 DIAGNOSIS — M51.36 LUMBAR DEGENERATIVE DISC DISEASE: Primary | ICD-10-CM

## 2024-09-04 DIAGNOSIS — E11.29 TYPE 2 DIABETES MELLITUS WITH OTHER DIABETIC KIDNEY COMPLICATION (HCC): ICD-10-CM

## 2024-09-04 PROCEDURE — 3074F SYST BP LT 130 MM HG: CPT | Performed by: INTERNAL MEDICINE

## 2024-09-04 PROCEDURE — 3078F DIAST BP <80 MM HG: CPT | Performed by: INTERNAL MEDICINE

## 2024-09-04 PROCEDURE — 99214 OFFICE O/P EST MOD 30 MIN: CPT | Performed by: INTERNAL MEDICINE

## 2024-09-04 PROCEDURE — 3046F HEMOGLOBIN A1C LEVEL >9.0%: CPT | Performed by: INTERNAL MEDICINE

## 2024-09-04 RX ORDER — GABAPENTIN 100 MG/1
100 CAPSULE ORAL NIGHTLY
Qty: 30 CAPSULE | Refills: 5 | Status: SHIPPED | OUTPATIENT
Start: 2024-09-04 | End: 2025-03-03

## 2024-09-04 RX ORDER — LIDOCAINE 50 MG/G
1 PATCH TOPICAL DAILY
Qty: 30 PATCH | Refills: 3 | Status: SHIPPED | OUTPATIENT
Start: 2024-09-04

## 2024-09-04 RX ORDER — LIDOCAINE 50 MG/G
1 PATCH TOPICAL DAILY
COMMUNITY
Start: 2024-08-15 | End: 2024-09-04 | Stop reason: SDUPTHER

## 2024-09-04 SDOH — ECONOMIC STABILITY: INCOME INSECURITY: HOW HARD IS IT FOR YOU TO PAY FOR THE VERY BASICS LIKE FOOD, HOUSING, MEDICAL CARE, AND HEATING?: NOT HARD AT ALL

## 2024-09-04 SDOH — ECONOMIC STABILITY: FOOD INSECURITY: WITHIN THE PAST 12 MONTHS, THE FOOD YOU BOUGHT JUST DIDN'T LAST AND YOU DIDN'T HAVE MONEY TO GET MORE.: NEVER TRUE

## 2024-09-04 SDOH — ECONOMIC STABILITY: FOOD INSECURITY: WITHIN THE PAST 12 MONTHS, YOU WORRIED THAT YOUR FOOD WOULD RUN OUT BEFORE YOU GOT MONEY TO BUY MORE.: NEVER TRUE

## 2024-09-04 ASSESSMENT — PATIENT HEALTH QUESTIONNAIRE - PHQ9
SUM OF ALL RESPONSES TO PHQ QUESTIONS 1-9: 0
2. FEELING DOWN, DEPRESSED OR HOPELESS: NOT AT ALL
SUM OF ALL RESPONSES TO PHQ QUESTIONS 1-9: 0
SUM OF ALL RESPONSES TO PHQ9 QUESTIONS 1 & 2: 0
SUM OF ALL RESPONSES TO PHQ QUESTIONS 1-9: 0
SUM OF ALL RESPONSES TO PHQ QUESTIONS 1-9: 0
1. LITTLE INTEREST OR PLEASURE IN DOING THINGS: NOT AT ALL

## 2024-10-21 ENCOUNTER — HOSPITAL ENCOUNTER (OUTPATIENT)
Dept: WOMENS IMAGING | Facility: HOSPITAL | Age: 59
Discharge: HOME | End: 2024-10-24
Payer: MEDICAID

## 2024-10-21 DIAGNOSIS — Z12.31 SCREENING MAMMOGRAM FOR BREAST CANCER: ICD-10-CM

## 2024-10-21 PROCEDURE — 77063 BREAST TOMOSYNTHESIS BI: CPT

## 2025-02-13 ENCOUNTER — OFFICE VISIT (OUTPATIENT)
Facility: CLINIC | Age: 60
End: 2025-02-13
Payer: MEDICAID

## 2025-02-13 VITALS
HEIGHT: 59 IN | HEART RATE: 71 BPM | BODY MASS INDEX: 26.21 KG/M2 | OXYGEN SATURATION: 96 % | DIASTOLIC BLOOD PRESSURE: 77 MMHG | SYSTOLIC BLOOD PRESSURE: 109 MMHG | WEIGHT: 130 LBS | TEMPERATURE: 98 F | RESPIRATION RATE: 14 BRPM

## 2025-02-13 DIAGNOSIS — I10 ESSENTIAL (PRIMARY) HYPERTENSION: ICD-10-CM

## 2025-02-13 DIAGNOSIS — E78.5 HYPERLIPIDEMIA, UNSPECIFIED HYPERLIPIDEMIA TYPE: ICD-10-CM

## 2025-02-13 DIAGNOSIS — E04.1 LEFT THYROID NODULE: ICD-10-CM

## 2025-02-13 DIAGNOSIS — Z00.00 WELL WOMAN EXAM (NO GYNECOLOGICAL EXAM): Primary | ICD-10-CM

## 2025-02-13 DIAGNOSIS — E11.9 TYPE 2 DIABETES MELLITUS WITHOUT COMPLICATION, WITHOUT LONG-TERM CURRENT USE OF INSULIN (HCC): ICD-10-CM

## 2025-02-13 DIAGNOSIS — E04.2 NONTOXIC MULTINODULAR GOITER: ICD-10-CM

## 2025-02-13 PROBLEM — E11.29 TYPE 2 DIABETES MELLITUS WITH OTHER DIABETIC KIDNEY COMPLICATION (HCC): Status: RESOLVED | Noted: 2025-02-13 | Resolved: 2025-02-13

## 2025-02-13 PROBLEM — E11.29 TYPE 2 DIABETES MELLITUS WITH OTHER DIABETIC KIDNEY COMPLICATION (HCC): Status: ACTIVE | Noted: 2025-02-13

## 2025-02-13 PROCEDURE — 99396 PREV VISIT EST AGE 40-64: CPT | Performed by: INTERNAL MEDICINE

## 2025-02-13 PROCEDURE — 3074F SYST BP LT 130 MM HG: CPT | Performed by: INTERNAL MEDICINE

## 2025-02-13 PROCEDURE — 3078F DIAST BP <80 MM HG: CPT | Performed by: INTERNAL MEDICINE

## 2025-02-13 RX ORDER — LOSARTAN POTASSIUM 50 MG/1
50 TABLET ORAL DAILY
Qty: 90 TABLET | Refills: 1 | Status: SHIPPED | OUTPATIENT
Start: 2025-02-13

## 2025-02-13 RX ORDER — ATORVASTATIN CALCIUM 10 MG/1
10 TABLET, FILM COATED ORAL DAILY
Qty: 90 TABLET | Refills: 1 | Status: SHIPPED | OUTPATIENT
Start: 2025-02-13

## 2025-02-13 RX ORDER — DAPAGLIFLOZIN AND METFORMIN HYDROCHLORIDE 5; 1000 MG/1; MG/1
TABLET, FILM COATED, EXTENDED RELEASE ORAL
Qty: 180 TABLET | Refills: 1 | Status: SHIPPED | OUTPATIENT
Start: 2025-02-13

## 2025-02-13 SDOH — ECONOMIC STABILITY: FOOD INSECURITY: WITHIN THE PAST 12 MONTHS, YOU WORRIED THAT YOUR FOOD WOULD RUN OUT BEFORE YOU GOT MONEY TO BUY MORE.: NEVER TRUE

## 2025-02-13 SDOH — ECONOMIC STABILITY: FOOD INSECURITY: WITHIN THE PAST 12 MONTHS, THE FOOD YOU BOUGHT JUST DIDN'T LAST AND YOU DIDN'T HAVE MONEY TO GET MORE.: NEVER TRUE

## 2025-02-13 ASSESSMENT — PATIENT HEALTH QUESTIONNAIRE - PHQ9
SUM OF ALL RESPONSES TO PHQ9 QUESTIONS 1 & 2: 0
SUM OF ALL RESPONSES TO PHQ QUESTIONS 1-9: 0
2. FEELING DOWN, DEPRESSED OR HOPELESS: NOT AT ALL
1. LITTLE INTEREST OR PLEASURE IN DOING THINGS: NOT AT ALL
SUM OF ALL RESPONSES TO PHQ QUESTIONS 1-9: 0

## 2025-02-13 NOTE — PROGRESS NOTES
\"Have you been to the ER, urgent care clinic since your last visit?  Hospitalized since your last visit?\"    NO    “Have you seen or consulted any other health care providers outside our system since your last visit?”    Yes, endocrinology in January       “Have you had a diabetic eye exam?”    YES - [facility], requesting records   Date of last diabetic eye exam: 10/14/2021          
vaccine (1)-done at work 08/01/2024    COVID-19 Vaccine (6 - 2024-25 season) 09/01/2024    A1C test (Diabetic or Prediabetic) -today 10/10/2024      ?fasting    Wt Readings from Last 3 Encounters:   02/13/25 59 kg (130 lb)   09/04/24 60.5 kg (133 lb 6.4 oz)   07/16/24 61.1 kg (134 lb 9.6 oz)        BP Readings from Last 3 Encounters:   02/13/25 109/77   09/04/24 113/73   07/16/24 111/75        Hemoglobin A1C   Date Value Ref Range Status   07/10/2024 10.0 (H) 4.8 - 5.6 % Final     Comment:                 Prediabetes: 5.7 - 6.4           Diabetes: >6.4           Glycemic control for adults with diabetes: <7.0       Hemoglobin A1C, POC   Date Value Ref Range Status   11/10/2022 7.6 % Final        Lab Results   Component Value Date    CHOL 176 07/10/2024    TRIG 149 07/10/2024    HDL 48 07/10/2024     (H) 07/10/2024    VLDL 26 07/10/2024    Had coffee no sugar    Since last visit, had left thyroid biopsy-benign she was told-I gave her phone number to call    Patient she feels dizzy at times in the early morning    Just got re-      ROS:    Pt denies: Wt loss, Fever/Chills, HA, Visual changes, Fatigue, Chest pain, SOB, SOSA, Abd pain, N/V/D/C, Blood in stool or urine, Edema. Pertinent positive as above in HPI. All others were negative    Patient Active Problem List   Diagnosis    Essential hypertension    Gastroesophageal reflux disease    Family history of breast cancer in sister    Hyperglycemia due to type 2 diabetes mellitus (HCC)    Hyperlipidemia    BRCA2 gene mutation negative    History of CVA (cerebrovascular accident)    Overweight (BMI 25.0-29.9)    Aneurysm of middle cerebral artery    Multiple thyroid nodules    Mixed stress and urge urinary incontinence    Seasonal allergic rhinitis       Past Medical History:   Diagnosis Date    Environmental allergies     Headache(784.0)     hx migraines    HTN (hypertension) 9/23/2013    Hyperlipidemia 6/29/2018    Lacunar infarct, acute (HCC) 2/17/16

## 2025-02-14 LAB
ALBUMIN SERPL-MCNC: 4.6 G/DL (ref 3.8–4.9)
ALP SERPL-CCNC: 106 IU/L (ref 44–121)
ALT SERPL-CCNC: 23 IU/L (ref 0–32)
AST SERPL-CCNC: 18 IU/L (ref 0–40)
BILIRUB SERPL-MCNC: 0.4 MG/DL (ref 0–1.2)
BUN SERPL-MCNC: 15 MG/DL (ref 6–24)
BUN/CREAT SERPL: 25 (ref 9–23)
CALCIUM SERPL-MCNC: 10.4 MG/DL (ref 8.7–10.2)
CHLORIDE SERPL-SCNC: 101 MMOL/L (ref 96–106)
CHOLEST SERPL-MCNC: 184 MG/DL (ref 100–199)
CO2 SERPL-SCNC: 23 MMOL/L (ref 20–29)
CREAT SERPL-MCNC: 0.6 MG/DL (ref 0.57–1)
EGFRCR SERPLBLD CKD-EPI 2021: 104 ML/MIN/1.73
ERYTHROCYTE [DISTWIDTH] IN BLOOD BY AUTOMATED COUNT: 12.4 % (ref 11.7–15.4)
GLOBULIN SER CALC-MCNC: 3 G/DL (ref 1.5–4.5)
GLUCOSE SERPL-MCNC: 120 MG/DL (ref 70–99)
HBA1C MFR BLD: 8.1 % (ref 4.8–5.6)
HCT VFR BLD AUTO: 48.9 % (ref 34–46.6)
HDLC SERPL-MCNC: 44 MG/DL
HGB BLD-MCNC: 15.8 G/DL (ref 11.1–15.9)
LDLC SERPL CALC-MCNC: 93 MG/DL (ref 0–99)
MCH RBC QN AUTO: 29.8 PG (ref 26.6–33)
MCHC RBC AUTO-ENTMCNC: 32.3 G/DL (ref 31.5–35.7)
MCV RBC AUTO: 92 FL (ref 79–97)
PLATELET # BLD AUTO: 253 X10E3/UL (ref 150–450)
POTASSIUM SERPL-SCNC: 4.3 MMOL/L (ref 3.5–5.2)
PROT SERPL-MCNC: 7.6 G/DL (ref 6–8.5)
RBC # BLD AUTO: 5.3 X10E6/UL (ref 3.77–5.28)
SODIUM SERPL-SCNC: 142 MMOL/L (ref 134–144)
TRIGL SERPL-MCNC: 281 MG/DL (ref 0–149)
TSH SERPL DL<=0.005 MIU/L-ACNC: 1.02 UIU/ML (ref 0.45–4.5)
VLDLC SERPL CALC-MCNC: 47 MG/DL (ref 5–40)
WBC # BLD AUTO: 7.9 X10E3/UL (ref 3.4–10.8)

## 2025-04-01 ENCOUNTER — TELEPHONE (OUTPATIENT)
Facility: CLINIC | Age: 60
End: 2025-04-01

## 2025-04-02 NOTE — TELEPHONE ENCOUNTER
returned call to patient. No answer. Left message on voice mail informing patient coulf  form from . Also informed patient there was nothing for Dr. Lisandro Negron to fill out or sign. If dates needed for form that information could be found in my chart.

## 2025-04-09 ENCOUNTER — RESULTS FOLLOW-UP (OUTPATIENT)
Facility: CLINIC | Age: 60
End: 2025-04-09